# Patient Record
Sex: MALE | Race: WHITE | NOT HISPANIC OR LATINO | URBAN - METROPOLITAN AREA
[De-identification: names, ages, dates, MRNs, and addresses within clinical notes are randomized per-mention and may not be internally consistent; named-entity substitution may affect disease eponyms.]

---
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
## 2023-12-20 ENCOUNTER — APPOINTMENT (OUTPATIENT)
Dept: GENERAL RADIOLOGY | Facility: HOSPITAL | Age: 18
End: 2023-12-20
Payer: MEDICAID

## 2023-12-20 ENCOUNTER — HOSPITAL ENCOUNTER (EMERGENCY)
Facility: HOSPITAL | Age: 18
Discharge: HOME | End: 2023-12-21
Attending: EMERGENCY MEDICINE
Payer: MEDICAID

## 2023-12-20 ENCOUNTER — APPOINTMENT (OUTPATIENT)
Dept: CT IMAGING | Facility: HOSPITAL | Age: 18
End: 2023-12-20
Payer: MEDICAID

## 2023-12-20 DIAGNOSIS — R11.2 NAUSEA AND VOMITING, UNSPECIFIED VOMITING TYPE: ICD-10-CM

## 2023-12-20 DIAGNOSIS — R10.84 GENERALIZED ABDOMINAL PAIN: Primary | ICD-10-CM

## 2023-12-20 DIAGNOSIS — K21.9 GASTROESOPHAGEAL REFLUX DISEASE, UNSPECIFIED WHETHER ESOPHAGITIS PRESENT: ICD-10-CM

## 2023-12-20 LAB
ALBUMIN SERPL-MCNC: 4.8 G/DL (ref 3.5–5.2)
ALBUMIN/GLOB SERPL: 1.3 G/DL
ALP SERPL-CCNC: 135 U/L (ref 39–117)
ALT SERPL W P-5'-P-CCNC: 60 U/L (ref 1–41)
ANION GAP SERPL CALCULATED.3IONS-SCNC: 17 MMOL/L (ref 5–15)
AST SERPL-CCNC: 30 U/L (ref 1–40)
BASOPHILS # BLD AUTO: 0.1 10*3/MM3 (ref 0–0.2)
BASOPHILS NFR BLD AUTO: 1 % (ref 0–1.5)
BILIRUB SERPL-MCNC: 0.8 MG/DL (ref 0–1.2)
BUN SERPL-MCNC: 8 MG/DL (ref 6–20)
BUN/CREAT SERPL: 6.5 (ref 7–25)
CALCIUM SPEC-SCNC: 10.1 MG/DL (ref 8.6–10.5)
CHLORIDE SERPL-SCNC: 99 MMOL/L (ref 98–107)
CK SERPL-CCNC: 191 U/L (ref 20–200)
CO2 SERPL-SCNC: 21 MMOL/L (ref 22–29)
CREAT SERPL-MCNC: 1.23 MG/DL (ref 0.76–1.27)
DEPRECATED RDW RBC AUTO: 43.8 FL (ref 37–54)
EGFRCR SERPLBLD CKD-EPI 2021: 86.2 ML/MIN/1.73
EOSINOPHIL # BLD AUTO: 0 10*3/MM3 (ref 0–0.4)
EOSINOPHIL NFR BLD AUTO: 0.3 % (ref 0.3–6.2)
ERYTHROCYTE [DISTWIDTH] IN BLOOD BY AUTOMATED COUNT: 13.5 % (ref 12.3–15.4)
GLOBULIN UR ELPH-MCNC: 3.6 GM/DL
GLUCOSE SERPL-MCNC: 110 MG/DL (ref 65–99)
HCT VFR BLD AUTO: 51.6 % (ref 37.5–51)
HGB BLD-MCNC: 17.7 G/DL (ref 13–17.7)
LIPASE SERPL-CCNC: 18 U/L (ref 13–60)
LYMPHOCYTES # BLD AUTO: 3.3 10*3/MM3 (ref 0.7–3.1)
LYMPHOCYTES NFR BLD AUTO: 24.1 % (ref 19.6–45.3)
MCH RBC QN AUTO: 29.9 PG (ref 26.6–33)
MCHC RBC AUTO-ENTMCNC: 34.3 G/DL (ref 31.5–35.7)
MCV RBC AUTO: 87.3 FL (ref 79–97)
MONOCYTES # BLD AUTO: 1 10*3/MM3 (ref 0.1–0.9)
MONOCYTES NFR BLD AUTO: 7.4 % (ref 5–12)
NEUTROPHILS NFR BLD AUTO: 67.2 % (ref 42.7–76)
NEUTROPHILS NFR BLD AUTO: 9.3 10*3/MM3 (ref 1.7–7)
NRBC BLD AUTO-RTO: 0.1 /100 WBC (ref 0–0.2)
PLATELET # BLD AUTO: 338 10*3/MM3 (ref 140–450)
PMV BLD AUTO: 8 FL (ref 6–12)
POTASSIUM SERPL-SCNC: 3.6 MMOL/L (ref 3.5–5.2)
PROT SERPL-MCNC: 8.4 G/DL (ref 6–8.5)
RBC # BLD AUTO: 5.91 10*6/MM3 (ref 4.14–5.8)
SODIUM SERPL-SCNC: 137 MMOL/L (ref 136–145)
TROPONIN T SERPL HS-MCNC: 8 NG/L
WBC NRBC COR # BLD AUTO: 13.8 10*3/MM3 (ref 3.4–10.8)

## 2023-12-20 PROCEDURE — 83690 ASSAY OF LIPASE: CPT | Performed by: PHYSICIAN ASSISTANT

## 2023-12-20 PROCEDURE — 84484 ASSAY OF TROPONIN QUANT: CPT | Performed by: PHYSICIAN ASSISTANT

## 2023-12-20 PROCEDURE — 85025 COMPLETE CBC W/AUTO DIFF WBC: CPT | Performed by: PHYSICIAN ASSISTANT

## 2023-12-20 PROCEDURE — 72072 X-RAY EXAM THORAC SPINE 3VWS: CPT

## 2023-12-20 PROCEDURE — 25510000001 IOPAMIDOL PER 1 ML: Performed by: EMERGENCY MEDICINE

## 2023-12-20 PROCEDURE — 80053 COMPREHEN METABOLIC PANEL: CPT | Performed by: PHYSICIAN ASSISTANT

## 2023-12-20 PROCEDURE — 25010000002 DIPHENHYDRAMINE PER 50 MG: Performed by: PHYSICIAN ASSISTANT

## 2023-12-20 PROCEDURE — 93005 ELECTROCARDIOGRAM TRACING: CPT | Performed by: PHYSICIAN ASSISTANT

## 2023-12-20 PROCEDURE — 82550 ASSAY OF CK (CPK): CPT | Performed by: PHYSICIAN ASSISTANT

## 2023-12-20 PROCEDURE — 99285 EMERGENCY DEPT VISIT HI MDM: CPT

## 2023-12-20 PROCEDURE — 96374 THER/PROPH/DIAG INJ IV PUSH: CPT

## 2023-12-20 PROCEDURE — 71045 X-RAY EXAM CHEST 1 VIEW: CPT

## 2023-12-20 PROCEDURE — 96375 TX/PRO/DX INJ NEW DRUG ADDON: CPT

## 2023-12-20 PROCEDURE — 25010000002 ONDANSETRON PER 1 MG: Performed by: PHYSICIAN ASSISTANT

## 2023-12-20 PROCEDURE — 25010000002 METOCLOPRAMIDE PER 10 MG: Performed by: PHYSICIAN ASSISTANT

## 2023-12-20 PROCEDURE — 74177 CT ABD & PELVIS W/CONTRAST: CPT

## 2023-12-20 RX ORDER — SODIUM CHLORIDE 0.9 % (FLUSH) 0.9 %
10 SYRINGE (ML) INJECTION AS NEEDED
Status: DISCONTINUED | OUTPATIENT
Start: 2023-12-20 | End: 2023-12-21 | Stop reason: HOSPADM

## 2023-12-20 RX ORDER — METOPROLOL TARTRATE 1 MG/ML
2.5 INJECTION, SOLUTION INTRAVENOUS ONCE
Status: DISCONTINUED | OUTPATIENT
Start: 2023-12-20 | End: 2023-12-20

## 2023-12-20 RX ORDER — FAMOTIDINE 10 MG/ML
20 INJECTION, SOLUTION INTRAVENOUS ONCE
Status: COMPLETED | OUTPATIENT
Start: 2023-12-20 | End: 2023-12-20

## 2023-12-20 RX ORDER — ONDANSETRON 2 MG/ML
4 INJECTION INTRAMUSCULAR; INTRAVENOUS ONCE
Status: COMPLETED | OUTPATIENT
Start: 2023-12-20 | End: 2023-12-20

## 2023-12-20 RX ORDER — DIPHENHYDRAMINE HYDROCHLORIDE 50 MG/ML
25 INJECTION INTRAMUSCULAR; INTRAVENOUS ONCE
Status: COMPLETED | OUTPATIENT
Start: 2023-12-20 | End: 2023-12-20

## 2023-12-20 RX ORDER — METOCLOPRAMIDE HYDROCHLORIDE 5 MG/ML
10 INJECTION INTRAMUSCULAR; INTRAVENOUS ONCE
Status: COMPLETED | OUTPATIENT
Start: 2023-12-20 | End: 2023-12-20

## 2023-12-20 RX ORDER — ALUMINA, MAGNESIA, AND SIMETHICONE 2400; 2400; 240 MG/30ML; MG/30ML; MG/30ML
15 SUSPENSION ORAL EVERY 6 HOURS PRN
Status: DISCONTINUED | OUTPATIENT
Start: 2023-12-20 | End: 2023-12-21 | Stop reason: HOSPADM

## 2023-12-20 RX ADMIN — ALUMINUM HYDROXIDE, MAGNESIUM HYDROXIDE, AND DIMETHICONE 15 ML: 400; 400; 40 SUSPENSION ORAL at 22:31

## 2023-12-20 RX ADMIN — ONDANSETRON 4 MG: 2 INJECTION INTRAMUSCULAR; INTRAVENOUS at 20:55

## 2023-12-20 RX ADMIN — METOCLOPRAMIDE 10 MG: 5 INJECTION, SOLUTION INTRAMUSCULAR; INTRAVENOUS at 22:55

## 2023-12-20 RX ADMIN — IOPAMIDOL 100 ML: 755 INJECTION, SOLUTION INTRAVENOUS at 23:34

## 2023-12-20 RX ADMIN — FAMOTIDINE 20 MG: 10 INJECTION INTRAVENOUS at 20:56

## 2023-12-20 RX ADMIN — DIPHENHYDRAMINE HYDROCHLORIDE 25 MG: 50 INJECTION, SOLUTION INTRAMUSCULAR; INTRAVENOUS at 22:55

## 2023-12-21 ENCOUNTER — NURSE TRIAGE (OUTPATIENT)
Dept: CALL CENTER | Facility: HOSPITAL | Age: 18
End: 2023-12-21
Payer: MEDICAID

## 2023-12-21 VITALS
RESPIRATION RATE: 18 BRPM | TEMPERATURE: 97.8 F | DIASTOLIC BLOOD PRESSURE: 94 MMHG | HEIGHT: 72 IN | SYSTOLIC BLOOD PRESSURE: 164 MMHG | BODY MASS INDEX: 40.82 KG/M2 | WEIGHT: 301.37 LBS | OXYGEN SATURATION: 99 % | HEART RATE: 92 BPM

## 2023-12-21 LAB
QT INTERVAL: 307 MS
QTC INTERVAL: 410 MS

## 2023-12-21 RX ORDER — ONDANSETRON 8 MG/1
8 TABLET, ORALLY DISINTEGRATING ORAL EVERY 8 HOURS PRN
Qty: 20 TABLET | Refills: 0 | Status: SHIPPED | OUTPATIENT
Start: 2023-12-21 | End: 2023-12-23 | Stop reason: HOSPADM

## 2023-12-21 RX ORDER — PANTOPRAZOLE SODIUM 20 MG/1
20 TABLET, DELAYED RELEASE ORAL DAILY
Qty: 30 TABLET | Refills: 0 | Status: SHIPPED | OUTPATIENT
Start: 2023-12-21 | End: 2023-12-23 | Stop reason: HOSPADM

## 2023-12-21 NOTE — ED PROVIDER NOTES
[facility]  [address]  [facility] [address]      Radha Woodard : 1994 MRN: [id number]        2018  Time Session Began: 4 PM  Time Session Ended: 4:35 PM    Session Type: 30 Minute Therapy (81648)    Others Present: no    Intervention: Behavioral, Cognitive, Supportive    Suicide/Homicide/Violence Ideation: No    If Yes, explain:     Current Outpatient Prescriptions   Medication Sig   • verapamil 120 MG 24 hr capsule TAKE 1 CAPSULE BY MOUTH EVERY NIGHT   • prochlorperazine (COMPAZINE) 5 MG tablet Take 1-2 tablets by mouth every 6 hours as needed for Nausea (migraine).   • PARoxetine (PAXIL) 20 MG tablet Take 1 tablet by mouth daily.     No current facility-administered medications for this visit.        Change in Medication(s) Reported: No  If Yes, explain:     Patient/Family Education Provided: Yes  Patient/Family Displays Understanding: Yes    If No, explain:     Chief complaint in patient's own words: \"I think I'm doing good. Our communication has gotten better. We haven't had a blow out argument. There is more communication and understanding. When I get frustrated, I try to talk about it and I don't wait until a blow up. I try to do the breathing relaxation and that helps quite a bit. Football has started and the exercise helps too.\"    Progress Note containing chief complaint and symptoms/problems related to the complaint:    D: The patient presented for individual psychotherapy. She reported a decrease in her anxiety. She discussed her increased awareness when she becomes irritable and explored how she is able to \"catch\" herself. She identified and discussed a recent situation during which she became angry with her roommate, was able to stop her angry communication and assertively and calmly express her wants with her partner. She reported she continues to use relaxation techniques which are useful in  "Subjective   History of Present Illness  Chief Complaint: Nausea, \"back tingling\"    Patient is a 18 year old  male with no significant past medical history presents to the ER with multiple complaints.  Patient complaining of nausea and vomiting as well as tingling in his upper back since September, 3 months ago.  Patient states that he tried hallucinogenic mushrooms 3 months ago and since then he has had the symptoms.  He has not followed up with primary care or other specialist in this time.  He denies any trauma or fall.  No other illicit substances.  He reports tingling in his thoracic spine that does not radiate, no reports of pain.  Patient also complains of nausea with increased phlegm production and feels like he chokes on his own saliva.  Patient states when this happens it causes him to gag and vomit.  He states that he has been able to eat and sometimes eating helps with his symptoms but when he starts to cough he cannot hold anything down.  He states his coughing and nausea is worse in the morning when he wakes up.  The symptoms are worse when he lays flat.  He denies any hematemesis.  No abdominal pain, no diarrhea hematochezia or melena.  No fever or chills.  No chest pain or shortness of breath    PCP: None    History provided by:  Patient      Review of Systems   Constitutional:  Negative for chills and fever.   HENT:  Positive for trouble swallowing. Negative for congestion.    Eyes: Negative.    Respiratory:  Positive for cough and chest tightness. Negative for shortness of breath and wheezing.    Cardiovascular:  Negative for chest pain.   Gastrointestinal:  Positive for nausea. Negative for abdominal pain, anal bleeding and vomiting.   Endocrine: Negative.    Genitourinary:  Negative for dysuria.   Musculoskeletal:  Positive for back pain. Negative for myalgias and neck pain.   Skin:  Negative for rash.   Allergic/Immunologic: Negative.    Neurological:  Negative for weakness and " headaches.   Psychiatric/Behavioral:  Negative for behavioral problems.    All other systems reviewed and are negative.      No past medical history on file.    No Known Allergies    No past surgical history on file.    No family history on file.    Social History     Socioeconomic History    Marital status: Single           Objective   Physical Exam  Vitals and nursing note reviewed.   Constitutional:       General: He is not in acute distress.     Appearance: Normal appearance. He is obese. He is not diaphoretic.   HENT:      Nose: Nose normal. No congestion.      Mouth/Throat:      Mouth: Mucous membranes are moist.      Pharynx: No oropharyngeal exudate or posterior oropharyngeal erythema.   Eyes:      Extraocular Movements: Extraocular movements intact.      Pupils: Pupils are equal, round, and reactive to light.   Cardiovascular:      Rate and Rhythm: Normal rate and regular rhythm.      Pulses: Normal pulses.      Heart sounds: Normal heart sounds. No murmur heard.  Pulmonary:      Effort: Pulmonary effort is normal.      Breath sounds: Normal breath sounds. No wheezing.   Abdominal:      General: Abdomen is flat.      Palpations: Abdomen is soft.      Tenderness: There is no abdominal tenderness.   Skin:     General: Skin is warm.      Capillary Refill: Capillary refill takes less than 2 seconds.   Neurological:      General: No focal deficit present.      Mental Status: He is alert and oriented to person, place, and time.      Motor: No weakness.   Psychiatric:         Mood and Affect: Mood normal.         Behavior: Behavior normal.         ECG 12 Lead      Date/Time: 12/21/2023 4:21 AM    Performed by: Amanda Cho PA  Authorized by: Baudilio Yepez MD  Interpreted by physician  Previous ECG: no previous ECG available  Rhythm: sinus tachycardia  Rate: tachycardic  BPM: 107  QRS axis: normal  Conduction: conduction normal  Clinical impression: non-specific ECG               ED Course  ED Course as of  reducing her anxiety. She discussed her increased exercise due to football practice and noted that the increased exercise helps to reduce her stress and anxiety. She reported she watched several MARQUISE Talks but had difficulty sitting still to watch the entire talk. She explored her efforts to focus on positive aspects of her life. She stated she would like to schedule her following health follow-up appointment with this writer and than transfer to a psychotherapist for longer term psychotherapy.  A: Utilized a supportive and cognitive behavioral approach with the patient. Reinforced her use of assertive communication, exercise, relaxation and distraction techniques to manage her stress and anxiety. Facilitated a discussion regarding the benefits of focusing on gratitude and positive aspects of life. Provided education and handouts regarding perfectionism and how to challenge and replace should/must thinking that attributes to perfectionism. Discussed the patient's interest in a transfer to a therapist for longer term psychotherapy.  R:  The patient was able to take responsibility for her anger and change behavior. She demonstrated insight regarding the importance of using assertive communication to express her feelings and wants, doing so before her anger intensifies and leads to a negative style of communication. She was open to feedback regarding her perfectionism and should/must thinking and participated in exploring how this type of thinking generates anxiety, anger and frustration. She is willing to schedule a final follow-up appointment this writer and is motivated to continue with longer term psychotherapy.  P:  The plan is to casual a final follow-up appointment with this writer and to facilitate a transfer for longer term psychotherapy.      Need for Community Resources Assessed: Yes    Resources Needed: No    If Yes, what resources:     Primary Diagnosis: Generalized Anxiety Disorder  : 2  "12/21/23 0425   Wed Dec 20, 2023   2204 Patient's elevated blood pressure and tachycardia during episodes of dry heaving.  When patient is resting and not dry heaving blood pressure 159/91 and heart rate 88 []   u Dec 21, 2023   0033 Patient reevaluated, he is resting with eyes closed, no longer dry heaving.  He reports feeling much better after Reglan and Benadryl.  Offered observation admission for IV hydration, antiemetics and GI consultation in the morning.  Patient states he prefers to be discharged home to sleep at home and follow-up outpatient.  Patient was advised that he may return to the ER at any point should his symptoms become worse or should he change his mind regarding admission. [MC]      ED Course User Index  [MC] Amanda Cho PA    /94   Pulse 92   Temp 97.8 °F (36.6 °C) (Oral)   Resp 18   Ht 182.9 cm (72\")   Wt (!) 137 kg (301 lb 5.9 oz)   SpO2 99%   BMI 40.87 kg/m²   Labs Reviewed   COMPREHENSIVE METABOLIC PANEL - Abnormal; Notable for the following components:       Result Value    Glucose 110 (*)     CO2 21.0 (*)     ALT (SGPT) 60 (*)     Alkaline Phosphatase 135 (*)     BUN/Creatinine Ratio 6.5 (*)     Anion Gap 17.0 (*)     All other components within normal limits    Narrative:     GFR Normal >60  Chronic Kidney Disease <60  Kidney Failure <15     CBC WITH AUTO DIFFERENTIAL - Abnormal; Notable for the following components:    WBC 13.80 (*)     RBC 5.91 (*)     Hematocrit 51.6 (*)     Neutrophils, Absolute 9.30 (*)     Lymphocytes, Absolute 3.30 (*)     Monocytes, Absolute 1.00 (*)     All other components within normal limits   LIPASE - Normal   SINGLE HSTROPONIN T - Normal    Narrative:     High Sensitive Troponin T Reference Range:  <14.0 ng/L- Negative Female for AMI  <22.0 ng/L- Negative Male for AMI  >=14 - Abnormal Female indicating possible myocardial injury.  >=22 - Abnormal Male indicating possible myocardial injury.   Clinicians would have to utilize clinical " Moderate    Treatment Plan: Unchanged    Discharge Plan: Strategies Discussed to Maintain Gains    Next Appointment: March 12, 2018  Annette Weiss, Ph.D., [facility] [place]         acumen, EKG, Troponin, and serial changes to determine if it is an Acute Myocardial Infarction or myocardial injury due to an underlying chronic condition.        CK - Normal   CBC AND DIFFERENTIAL    Narrative:     The following orders were created for panel order CBC & Differential.  Procedure                               Abnormality         Status                     ---------                               -----------         ------                     CBC Auto Differential[[id number]]        Abnormal            Final result                 Please view results for these tests on the individual orders.     Medications   famotidine (PEPCID) injection 20 mg (20 mg Intravenous Given 12/20/23 2056)   ondansetron (ZOFRAN) injection 4 mg (4 mg Intravenous Given 12/20/23 2055)   metoclopramide (REGLAN) injection 10 mg (10 mg Intravenous Given 12/20/23 2255)   diphenhydrAMINE (BENADRYL) injection 25 mg (25 mg Intravenous Given 12/20/23 2255)   iopamidol (ISOVUE-370) 76 % injection 100 mL (100 mL Intravenous Given 12/20/23 2334)     CT Abdomen Pelvis With Contrast    Result Date: 12/20/2023  Impression: 1.No acute intra-abdominal or intrapelvic process. 2.Ancillary findings as described above. Electronically Signed: Jovana Kern MD  12/20/2023 11:38 PM EST  Workstation ID: [id number]    XR Spine Thoracic 3 View    Result Date: 12/20/2023  Impression: No radiographic evidence of acute fracture or traumatic subluxation of the thoracic spine. Electronically Signed: Daniel Shay MD  12/20/2023 9:15 PM EST  Workstation ID: [id number]    XR Chest 1 View    Result Date: 12/20/2023  Impression: No acute pulmonary process Electronically Signed: Stone Emery MD  12/20/2023 9:01 PM EST  Workstation ID: [id number]                                            Medical Decision Making  Differential Dx (Includes but not limited to): Fracture contusion, pneumonia, GERD, hiatal hernia, reflux, esophagitis, Zenker's diverticulum  Medical  Records Reviewed: No pertinent records to review  Labs: On my interpretation, CBC no leukocytosis, CMP relatively unremarkable.  Normal troponin, lipase.  Imaging: On my interpretation, chest x-ray shows no obvious pneumonia.  CT abdomen pelvis shows no obvious obstruction, abscess diverticulitis or infectious process  Telemetry: EKG interpretation: Reviewed by myself interpreted by ER attending, sinus tachycardia rate of 107 no acute ST changes  Testing considered but not ordered: CT head patient denies headache.  Nature of Complaint: Acute  Admission vs Discharge: Discharge  Discussion: While in the ED IV was placed and labs were obtained appropriate PPE was worn during exam and throughout all encounters with the patient.  Patient had the above evaluation.  On initial evaluation patient is very anxious.  Patient was given Pepcid and Zofran initially with some improvement in his discomfort.  Lab work reassuring.  X-ray imaging unremarkable.  Patient started to complain of reflux symptoms again was given GI cocktail but then started to dry heave and vomit.  He was then given Reglan and Benadryl with resolution of his symptoms.  CT imaging unremarkable.  On reevaluation patient reports he is feeling better.  Offered observation admission for IV hydration, antiemetics and GI consultation, however patient declined, states he prefers to be discharged home and follow-up outpatient.  I feel that patient symptoms are likely related to severe reflux or gastritis.  Patient reports that he is able to swallow and does not have a sensation of food impaction.  Abdominal exam benign, there is no evidence of sepsis or acute infectious process.  Patient be discharged with prescription for Protonix, Zofran and he is encouraged to follow-up with PCP and GI for recheck, he will likely need EGD if symptoms persist.    Discharge plan and instructions were discussed with the patient who verbalized understanding and is in agreement with  the plan, all questions were answered at this time.  Patient is aware of signs symptoms that would require immediate return to the emergency room.  Patient understands importance of following up with primary care provider for further evaluation and worsening concerns as well as blood pressure recheck in the next 4 weeks.    Patient was discharged in improved stable condition with an upright steady gait.    Patient is aware that discharge does not mean that nothing is wrong but indicates no emergencies present and they must continue care with follow-up as given below or physician of their choice.    Problems Addressed:  Gastroesophageal reflux disease, unspecified whether esophagitis present: acute illness or injury  Generalized abdominal pain: acute illness or injury  Nausea and vomiting, unspecified vomiting type: acute illness or injury    Amount and/or Complexity of Data Reviewed  Labs: ordered. Decision-making details documented in ED Course.  Radiology: ordered. Decision-making details documented in ED Course.  ECG/medicine tests: ordered. Decision-making details documented in ED Course.    Risk  Prescription drug management.        Final diagnoses:   Generalized abdominal pain   Nausea and vomiting, unspecified vomiting type   Gastroesophageal reflux disease, unspecified whether esophagitis present       ED Disposition  ED Disposition       ED Disposition   Discharge    Condition   Stable    Comment   --               PATIENT CONNECTION - [place] [id number] [phone]  Schedule an appointment as soon as possible for a visit in 2 days  As needed, If symptoms worsen    Nino Cho PA-C  [address] [facility] [place] [address] [phone]    Schedule an appointment as soon as possible for a visit in 2 days  As needed, If symptoms worsen    Parker Mittal MD  [address] [facility] [place] [address] [phone]    Schedule an appointment as soon as possible for a visit in 2 days  As  needed, If symptoms worsen         Medication List        New Prescriptions      ondansetron ODT 8 MG disintegrating tablet  Commonly known as: ZOFRAN-ODT  Place 1 tablet under the tongue Every 8 (Eight) Hours As Needed for Nausea.     pantoprazole 20 MG EC tablet  Commonly known as: PROTONIX  Take 1 tablet by mouth Daily.               Where to Get Your Medications        These medications were sent to [facility]/pharmacy #[id number] - [place], [place] - [address] [facility] - [phone]  - [phone] FX  [address] [place] [address] [place] [address]      Hours: 24-hours Phone: [phone]   ondansetron ODT 8 MG disintegrating tablet  pantoprazole 20 MG EC tablet            Amanda Cho PA  12/21/23 9460

## 2023-12-21 NOTE — TELEPHONE ENCOUNTER
"Reason for Disposition   Systolic BP >= 160 OR Diastolic >= 100    Additional Information   Negative: Sounds like a life-threatening emergency to the triager   Negative: Symptom is main concern (e.g., headache, chest pain)   Negative: Low blood pressure is main concern   Negative: Systolic BP >= 160 OR Diastolic >= 100, and any cardiac (e.g., breathing difficulty, chest pain) or neurologic symptoms (e.g., new-onset blurred or double vision)   Negative: Pregnant 20 or more weeks (or postpartum < 6 weeks) with new hand or face swelling   Negative: Pregnant 20 or more weeks (or postpartum < 6 weeks) and Systolic BP >= 160 OR Diastolic >= 110   Negative: Patient sounds very sick or weak to the triager   Negative: Systolic BP >= 200 OR Diastolic >= 120 and having NO cardiac or neurologic symptoms   Negative: Pregnant 20 or more weeks (or postpartum < 6 weeks) with Systolic BP >= 140 OR Diastolic >= 90   Negative: Systolic BP >= 180 OR Diastolic >= 110, and missed most recent dose of blood pressure medication   Negative: Systolic BP >= 180 OR Diastolic >= 110   Negative: Patient wants to be seen   Negative: Ran out of BP medications   Negative: Taking BP medications and feels is having side effects (e.g., impotence, cough, dizziness)    Answer Assessment - Initial Assessment Questions  1. BLOOD PRESSURE: \"What is the blood pressure?\" \"Did you take at least two measurements 5 minutes apart?\"      154/101, 170/112 in Er last night was seen for vomiting and GERD  2. ONSET: \"When did you take your blood pressure?\"      Has been this way unknown amount of time  3. HOW: \"How did you take your blood pressure?\" (e.g., automatic home BP monitor, visiting nurse)      ER checked  4. HISTORY: \"Do you have a history of high blood pressure?\"      Runs in his family  5. MEDICINES: \"Are you taking any medicines for blood pressure?\" \"Have you missed any doses recently?\"      none  6. OTHER SYMPTOMS: \"Do you have any symptoms?\" (e.g., " "blurred vision, chest pain, difficulty breathing, headache, weakness)      none  7. PREGNANCY: \"Is there any chance you are pregnant?\" \"When was your last menstrual period?\"      na    Protocols used: Blood Pressure - High-ADULT-OH    "

## 2023-12-21 NOTE — DISCHARGE INSTRUCTIONS
Take Protonix once daily to help with reflux.  Take Zofran as needed for nausea vomiting    Follow-up with primary care for recheck  Follow-up with GI for reevaluation, you may need EGD.    Return to the ER for new or worsening symptoms or if you change her mind regarding admission.

## 2023-12-22 ENCOUNTER — HOSPITAL ENCOUNTER (OUTPATIENT)
Facility: HOSPITAL | Age: 18
Setting detail: OBSERVATION
Discharge: HOME | End: 2023-12-23
Attending: EMERGENCY MEDICINE | Admitting: EMERGENCY MEDICINE
Payer: MEDICAID

## 2023-12-22 ENCOUNTER — ANESTHESIA (OUTPATIENT)
Dept: GASTROENTEROLOGY | Facility: HOSPITAL | Age: 18
End: 2023-12-22
Payer: MEDICAID

## 2023-12-22 ENCOUNTER — ANESTHESIA EVENT (OUTPATIENT)
Dept: GASTROENTEROLOGY | Facility: HOSPITAL | Age: 18
End: 2023-12-22
Payer: MEDICAID

## 2023-12-22 ENCOUNTER — APPOINTMENT (OUTPATIENT)
Dept: ULTRASOUND IMAGING | Facility: HOSPITAL | Age: 18
End: 2023-12-22
Payer: MEDICAID

## 2023-12-22 ENCOUNTER — ON CAMPUS - OUTPATIENT (OUTPATIENT)
Dept: URBAN - METROPOLITAN AREA HOSPITAL 85 | Facility: HOSPITAL | Age: 18
End: 2023-12-22
Payer: COMMERCIAL

## 2023-12-22 DIAGNOSIS — R11.2 NAUSEA WITH VOMITING, UNSPECIFIED: ICD-10-CM

## 2023-12-22 DIAGNOSIS — K20.80 OTHER ESOPHAGITIS WITHOUT BLEEDING: ICD-10-CM

## 2023-12-22 DIAGNOSIS — K29.80 DUODENITIS WITHOUT BLEEDING: ICD-10-CM

## 2023-12-22 DIAGNOSIS — K29.50 UNSPECIFIED CHRONIC GASTRITIS WITHOUT BLEEDING: ICD-10-CM

## 2023-12-22 DIAGNOSIS — R10.13 EPIGASTRIC PAIN: ICD-10-CM

## 2023-12-22 DIAGNOSIS — B96.81 HELICOBACTER PYLORI [H. PYLORI] AS THE CAUSE OF DISEASES CLA: ICD-10-CM

## 2023-12-22 DIAGNOSIS — R11.2 NAUSEA AND VOMITING, UNSPECIFIED VOMITING TYPE: Primary | ICD-10-CM

## 2023-12-22 LAB
ALBUMIN SERPL-MCNC: 4.6 G/DL (ref 3.5–5.2)
ALBUMIN/GLOB SERPL: 1.5 G/DL
ALP SERPL-CCNC: 121 U/L (ref 39–117)
ALPHA1 GLOB MFR UR ELPH: 151 MG/DL (ref 90–200)
ALT SERPL W P-5'-P-CCNC: 50 U/L (ref 1–41)
AMPHET+METHAMPHET UR QL: NEGATIVE
ANION GAP SERPL CALCULATED.3IONS-SCNC: 14 MMOL/L (ref 5–15)
AST SERPL-CCNC: 28 U/L (ref 1–40)
BACTERIA UR QL AUTO: NORMAL /HPF
BARBITURATES UR QL SCN: NEGATIVE
BASOPHILS # BLD AUTO: 0.1 10*3/MM3 (ref 0–0.2)
BASOPHILS NFR BLD AUTO: 0.6 % (ref 0–1.5)
BENZODIAZ UR QL SCN: NEGATIVE
BILIRUB SERPL-MCNC: 0.7 MG/DL (ref 0–1.2)
BILIRUB UR QL STRIP: NEGATIVE
BUN SERPL-MCNC: 11 MG/DL (ref 6–20)
BUN/CREAT SERPL: 9.6 (ref 7–25)
CALCIUM SPEC-SCNC: 9.7 MG/DL (ref 8.6–10.5)
CANNABINOIDS SERPL QL: POSITIVE
CERULOPLASMIN SERPL-MCNC: 20 MG/DL (ref 16–31)
CHLORIDE SERPL-SCNC: 96 MMOL/L (ref 98–107)
CLARITY UR: ABNORMAL
CO2 SERPL-SCNC: 25 MMOL/L (ref 22–29)
COCAINE UR QL: NEGATIVE
COLOR UR: ABNORMAL
CREAT SERPL-MCNC: 1.14 MG/DL (ref 0.76–1.27)
DEPRECATED RDW RBC AUTO: 42 FL (ref 37–54)
EGFRCR SERPLBLD CKD-EPI 2021: 94.4 ML/MIN/1.73
EOSINOPHIL # BLD AUTO: 0 10*3/MM3 (ref 0–0.4)
EOSINOPHIL NFR BLD AUTO: 0.2 % (ref 0.3–6.2)
ERYTHROCYTE [DISTWIDTH] IN BLOOD BY AUTOMATED COUNT: 13.5 % (ref 12.3–15.4)
FERRITIN SERPL-MCNC: 219.2 NG/ML (ref 30–400)
GLOBULIN UR ELPH-MCNC: 3 GM/DL
GLUCOSE SERPL-MCNC: 102 MG/DL (ref 65–99)
GLUCOSE UR STRIP-MCNC: NEGATIVE MG/DL
HAV IGM SERPL QL IA: NORMAL
HBV CORE IGM SERPL QL IA: NORMAL
HBV SURFACE AG SERPL QL IA: NORMAL
HCT VFR BLD AUTO: 47.3 % (ref 37.5–51)
HCV AB SER DONR QL: NORMAL
HGB BLD-MCNC: 16.1 G/DL (ref 13–17.7)
HGB UR QL STRIP.AUTO: NEGATIVE
HYALINE CASTS UR QL AUTO: NORMAL /LPF
IRON 24H UR-MRATE: 131 MCG/DL (ref 59–158)
KETONES UR QL STRIP: ABNORMAL
LEUKOCYTE ESTERASE UR QL STRIP.AUTO: ABNORMAL
LIPASE SERPL-CCNC: 27 U/L (ref 13–60)
LYMPHOCYTES # BLD AUTO: 1.7 10*3/MM3 (ref 0.7–3.1)
LYMPHOCYTES NFR BLD AUTO: 14.8 % (ref 19.6–45.3)
MAGNESIUM SERPL-MCNC: 1.9 MG/DL (ref 1.7–2.2)
MCH RBC QN AUTO: 30.2 PG (ref 26.6–33)
MCHC RBC AUTO-ENTMCNC: 34 G/DL (ref 31.5–35.7)
MCV RBC AUTO: 88.8 FL (ref 79–97)
METHADONE UR QL SCN: NEGATIVE
MONOCYTES # BLD AUTO: 0.8 10*3/MM3 (ref 0.1–0.9)
MONOCYTES NFR BLD AUTO: 7.1 % (ref 5–12)
NEUTROPHILS NFR BLD AUTO: 77.3 % (ref 42.7–76)
NEUTROPHILS NFR BLD AUTO: 8.7 10*3/MM3 (ref 1.7–7)
NITRITE UR QL STRIP: NEGATIVE
NRBC BLD AUTO-RTO: 0 /100 WBC (ref 0–0.2)
OPIATES UR QL: NEGATIVE
OXYCODONE UR QL SCN: NEGATIVE
PH UR STRIP.AUTO: <=5 [PH] (ref 5–8)
PLATELET # BLD AUTO: 263 10*3/MM3 (ref 140–450)
PMV BLD AUTO: 7.8 FL (ref 6–12)
POTASSIUM SERPL-SCNC: 3.7 MMOL/L (ref 3.5–5.2)
PROT SERPL-MCNC: 7.6 G/DL (ref 6–8.5)
PROT UR QL STRIP: NEGATIVE
RBC # BLD AUTO: 5.32 10*6/MM3 (ref 4.14–5.8)
RBC # UR STRIP: NORMAL /HPF
REF LAB TEST METHOD: NORMAL
SODIUM SERPL-SCNC: 135 MMOL/L (ref 136–145)
SP GR UR STRIP: 1.02 (ref 1–1.03)
SQUAMOUS #/AREA URNS HPF: NORMAL /HPF
UROBILINOGEN UR QL STRIP: ABNORMAL
WBC # UR STRIP: NORMAL /HPF
WBC NRBC COR # BLD AUTO: 11.3 10*3/MM3 (ref 3.4–10.8)

## 2023-12-22 PROCEDURE — 76705 ECHO EXAM OF ABDOMEN: CPT

## 2023-12-22 PROCEDURE — 80053 COMPREHEN METABOLIC PANEL: CPT | Performed by: NURSE PRACTITIONER

## 2023-12-22 PROCEDURE — 83735 ASSAY OF MAGNESIUM: CPT | Performed by: NURSE PRACTITIONER

## 2023-12-22 PROCEDURE — 86015 ACTIN ANTIBODY EACH: CPT | Performed by: NURSE PRACTITIONER

## 2023-12-22 PROCEDURE — 86038 ANTINUCLEAR ANTIBODIES: CPT | Performed by: NURSE PRACTITIONER

## 2023-12-22 PROCEDURE — 80307 DRUG TEST PRSMV CHEM ANLYZR: CPT | Performed by: NURSE PRACTITIONER

## 2023-12-22 PROCEDURE — G0378 HOSPITAL OBSERVATION PER HR: HCPCS

## 2023-12-22 PROCEDURE — 25010000002 ONDANSETRON PER 1 MG: Performed by: ANESTHESIOLOGY

## 2023-12-22 PROCEDURE — 96374 THER/PROPH/DIAG INJ IV PUSH: CPT

## 2023-12-22 PROCEDURE — 83540 ASSAY OF IRON: CPT | Performed by: NURSE PRACTITIONER

## 2023-12-22 PROCEDURE — 81001 URINALYSIS AUTO W/SCOPE: CPT | Performed by: NURSE PRACTITIONER

## 2023-12-22 PROCEDURE — 25810000003 SODIUM CHLORIDE 0.9 % SOLUTION: Performed by: NURSE PRACTITIONER

## 2023-12-22 PROCEDURE — 86376 MICROSOMAL ANTIBODY EACH: CPT | Performed by: NURSE PRACTITIONER

## 2023-12-22 PROCEDURE — 83690 ASSAY OF LIPASE: CPT | Performed by: NURSE PRACTITIONER

## 2023-12-22 PROCEDURE — 82390 ASSAY OF CERULOPLASMIN: CPT | Performed by: NURSE PRACTITIONER

## 2023-12-22 PROCEDURE — 82728 ASSAY OF FERRITIN: CPT | Performed by: NURSE PRACTITIONER

## 2023-12-22 PROCEDURE — 25010000002 FENTANYL CITRATE (PF) 100 MCG/2ML SOLUTION: Performed by: NURSE ANESTHETIST, CERTIFIED REGISTERED

## 2023-12-22 PROCEDURE — 86381 MITOCHONDRIAL ANTIBODY EACH: CPT | Performed by: NURSE PRACTITIONER

## 2023-12-22 PROCEDURE — 43239 EGD BIOPSY SINGLE/MULTIPLE: CPT | Performed by: INTERNAL MEDICINE

## 2023-12-22 PROCEDURE — 96375 TX/PRO/DX INJ NEW DRUG ADDON: CPT

## 2023-12-22 PROCEDURE — 85025 COMPLETE CBC W/AUTO DIFF WBC: CPT | Performed by: NURSE PRACTITIONER

## 2023-12-22 PROCEDURE — 99285 EMERGENCY DEPT VISIT HI MDM: CPT

## 2023-12-22 PROCEDURE — 25010000002 ONDANSETRON PER 1 MG: Performed by: NURSE ANESTHETIST, CERTIFIED REGISTERED

## 2023-12-22 PROCEDURE — 25010000002 DIPHENHYDRAMINE PER 50 MG: Performed by: NURSE PRACTITIONER

## 2023-12-22 PROCEDURE — 88342 IMHCHEM/IMCYTCHM 1ST ANTB: CPT | Performed by: INTERNAL MEDICINE

## 2023-12-22 PROCEDURE — 82103 ALPHA-1-ANTITRYPSIN TOTAL: CPT | Performed by: NURSE PRACTITIONER

## 2023-12-22 PROCEDURE — 25010000002 PROPOFOL 500 MG/50ML EMULSION: Performed by: NURSE ANESTHETIST, CERTIFIED REGISTERED

## 2023-12-22 PROCEDURE — 25010000002 PROCHLORPERAZINE 10 MG/2ML SOLUTION: Performed by: NURSE PRACTITIONER

## 2023-12-22 PROCEDURE — 25810000003 SODIUM CHLORIDE 0.9 % SOLUTION: Performed by: NURSE ANESTHETIST, CERTIFIED REGISTERED

## 2023-12-22 PROCEDURE — 88305 TISSUE EXAM BY PATHOLOGIST: CPT | Performed by: INTERNAL MEDICINE

## 2023-12-22 PROCEDURE — 80074 ACUTE HEPATITIS PANEL: CPT | Performed by: NURSE PRACTITIONER

## 2023-12-22 RX ORDER — SCOLOPAMINE TRANSDERMAL SYSTEM 1 MG/1
1 PATCH, EXTENDED RELEASE TRANSDERMAL
Status: DISCONTINUED | OUTPATIENT
Start: 2023-12-22 | End: 2023-12-23 | Stop reason: HOSPADM

## 2023-12-22 RX ORDER — SODIUM CHLORIDE 0.9 % (FLUSH) 0.9 %
10 SYRINGE (ML) INJECTION AS NEEDED
Status: DISCONTINUED | OUTPATIENT
Start: 2023-12-22 | End: 2023-12-23 | Stop reason: HOSPADM

## 2023-12-22 RX ORDER — AMOXICILLIN 250 MG
2 CAPSULE ORAL 2 TIMES DAILY
Status: DISCONTINUED | OUTPATIENT
Start: 2023-12-22 | End: 2023-12-23 | Stop reason: HOSPADM

## 2023-12-22 RX ORDER — PROPOFOL 10 MG/ML
INJECTION, EMULSION INTRAVENOUS AS NEEDED
Status: DISCONTINUED | OUTPATIENT
Start: 2023-12-22 | End: 2023-12-22 | Stop reason: SURG

## 2023-12-22 RX ORDER — POLYETHYLENE GLYCOL 3350 17 G/17G
17 POWDER, FOR SOLUTION ORAL DAILY PRN
Status: DISCONTINUED | OUTPATIENT
Start: 2023-12-22 | End: 2023-12-23 | Stop reason: HOSPADM

## 2023-12-22 RX ORDER — ONDANSETRON 2 MG/ML
4 INJECTION INTRAMUSCULAR; INTRAVENOUS EVERY 6 HOURS PRN
Status: DISCONTINUED | OUTPATIENT
Start: 2023-12-22 | End: 2023-12-23 | Stop reason: HOSPADM

## 2023-12-22 RX ORDER — ONDANSETRON 2 MG/ML
INJECTION INTRAMUSCULAR; INTRAVENOUS AS NEEDED
Status: DISCONTINUED | OUTPATIENT
Start: 2023-12-22 | End: 2023-12-22 | Stop reason: SURG

## 2023-12-22 RX ORDER — BISACODYL 5 MG/1
5 TABLET, DELAYED RELEASE ORAL DAILY PRN
Status: DISCONTINUED | OUTPATIENT
Start: 2023-12-22 | End: 2023-12-23 | Stop reason: HOSPADM

## 2023-12-22 RX ORDER — PROCHLORPERAZINE EDISYLATE 5 MG/ML
5 INJECTION INTRAMUSCULAR; INTRAVENOUS ONCE
Status: COMPLETED | OUTPATIENT
Start: 2023-12-22 | End: 2023-12-22

## 2023-12-22 RX ORDER — DIPHENHYDRAMINE HYDROCHLORIDE 50 MG/ML
25 INJECTION INTRAMUSCULAR; INTRAVENOUS ONCE
Status: COMPLETED | OUTPATIENT
Start: 2023-12-22 | End: 2023-12-22

## 2023-12-22 RX ORDER — ONDANSETRON 4 MG/1
4 TABLET, FILM COATED ORAL EVERY 6 HOURS PRN
Status: DISCONTINUED | OUTPATIENT
Start: 2023-12-22 | End: 2023-12-23 | Stop reason: HOSPADM

## 2023-12-22 RX ORDER — ONDANSETRON 2 MG/ML
4 INJECTION INTRAMUSCULAR; INTRAVENOUS ONCE
Status: COMPLETED | OUTPATIENT
Start: 2023-12-22 | End: 2023-12-22

## 2023-12-22 RX ORDER — ACETAMINOPHEN 325 MG/1
650 TABLET ORAL EVERY 4 HOURS PRN
Status: DISCONTINUED | OUTPATIENT
Start: 2023-12-22 | End: 2023-12-23 | Stop reason: HOSPADM

## 2023-12-22 RX ORDER — NITROGLYCERIN 0.4 MG/1
0.4 TABLET SUBLINGUAL
Status: DISCONTINUED | OUTPATIENT
Start: 2023-12-22 | End: 2023-12-23 | Stop reason: HOSPADM

## 2023-12-22 RX ORDER — SODIUM CHLORIDE 0.9 % (FLUSH) 0.9 %
10 SYRINGE (ML) INJECTION EVERY 12 HOURS SCHEDULED
Status: DISCONTINUED | OUTPATIENT
Start: 2023-12-22 | End: 2023-12-23 | Stop reason: HOSPADM

## 2023-12-22 RX ORDER — PANTOPRAZOLE SODIUM 40 MG/1
40 TABLET, DELAYED RELEASE ORAL
Status: DISCONTINUED | OUTPATIENT
Start: 2023-12-22 | End: 2023-12-23 | Stop reason: HOSPADM

## 2023-12-22 RX ORDER — SODIUM CHLORIDE 9 MG/ML
40 INJECTION, SOLUTION INTRAVENOUS AS NEEDED
Status: DISCONTINUED | OUTPATIENT
Start: 2023-12-22 | End: 2023-12-23 | Stop reason: HOSPADM

## 2023-12-22 RX ORDER — FENTANYL CITRATE 50 UG/ML
INJECTION, SOLUTION INTRAMUSCULAR; INTRAVENOUS AS NEEDED
Status: DISCONTINUED | OUTPATIENT
Start: 2023-12-22 | End: 2023-12-22 | Stop reason: SURG

## 2023-12-22 RX ORDER — SODIUM CHLORIDE 9 MG/ML
INJECTION, SOLUTION INTRAVENOUS CONTINUOUS PRN
Status: DISCONTINUED | OUTPATIENT
Start: 2023-12-22 | End: 2023-12-22 | Stop reason: SURG

## 2023-12-22 RX ORDER — BISACODYL 10 MG
10 SUPPOSITORY, RECTAL RECTAL DAILY PRN
Status: DISCONTINUED | OUTPATIENT
Start: 2023-12-22 | End: 2023-12-23 | Stop reason: HOSPADM

## 2023-12-22 RX ORDER — LIDOCAINE HYDROCHLORIDE 20 MG/ML
INJECTION, SOLUTION INFILTRATION; PERINEURAL AS NEEDED
Status: DISCONTINUED | OUTPATIENT
Start: 2023-12-22 | End: 2023-12-22 | Stop reason: SURG

## 2023-12-22 RX ORDER — PANTOPRAZOLE SODIUM 20 MG/1
20 TABLET, DELAYED RELEASE ORAL DAILY
Status: DISCONTINUED | OUTPATIENT
Start: 2023-12-22 | End: 2023-12-22 | Stop reason: SDUPTHER

## 2023-12-22 RX ORDER — ONDANSETRON 2 MG/ML
4 INJECTION INTRAMUSCULAR; INTRAVENOUS EVERY 6 HOURS PRN
Status: DISCONTINUED | OUTPATIENT
Start: 2023-12-22 | End: 2023-12-22

## 2023-12-22 RX ADMIN — SCOPALAMINE 1 PATCH: 1 PATCH, EXTENDED RELEASE TRANSDERMAL at 17:26

## 2023-12-22 RX ADMIN — Medication 10 ML: at 21:11

## 2023-12-22 RX ADMIN — PROPOFOL INJECTABLE EMULSION 50 MG: 10 INJECTION, EMULSION INTRAVENOUS at 16:15

## 2023-12-22 RX ADMIN — ONDANSETRON 4 MG: 2 INJECTION INTRAMUSCULAR; INTRAVENOUS at 15:24

## 2023-12-22 RX ADMIN — DIPHENHYDRAMINE HYDROCHLORIDE 25 MG: 50 INJECTION, SOLUTION INTRAMUSCULAR; INTRAVENOUS at 07:54

## 2023-12-22 RX ADMIN — PANTOPRAZOLE SODIUM 40 MG: 40 TABLET, DELAYED RELEASE ORAL at 17:10

## 2023-12-22 RX ADMIN — ONDANSETRON 4 MG: 2 INJECTION INTRAMUSCULAR; INTRAVENOUS at 16:03

## 2023-12-22 RX ADMIN — FENTANYL CITRATE 100 MCG: 50 INJECTION, SOLUTION INTRAMUSCULAR; INTRAVENOUS at 15:58

## 2023-12-22 RX ADMIN — PROCHLORPERAZINE EDISYLATE 5 MG: 5 INJECTION INTRAMUSCULAR; INTRAVENOUS at 07:54

## 2023-12-22 RX ADMIN — FENTANYL CITRATE 100 MCG: 50 INJECTION, SOLUTION INTRAMUSCULAR; INTRAVENOUS at 16:05

## 2023-12-22 RX ADMIN — PROPOFOL INJECTABLE EMULSION 30 MG: 10 INJECTION, EMULSION INTRAVENOUS at 16:18

## 2023-12-22 RX ADMIN — SODIUM CHLORIDE 1000 ML: 9 INJECTION, SOLUTION INTRAVENOUS at 07:54

## 2023-12-22 RX ADMIN — PROPOFOL INJECTABLE EMULSION 50 MG: 10 INJECTION, EMULSION INTRAVENOUS at 16:14

## 2023-12-22 RX ADMIN — PROPOFOL INJECTABLE EMULSION 50 MG: 10 INJECTION, EMULSION INTRAVENOUS at 16:16

## 2023-12-22 RX ADMIN — SODIUM CHLORIDE: 9 INJECTION, SOLUTION INTRAVENOUS at 15:56

## 2023-12-22 RX ADMIN — LIDOCAINE HYDROCHLORIDE 100 MG: 20 INJECTION, SOLUTION INFILTRATION; PERINEURAL at 16:13

## 2023-12-22 RX ADMIN — PROPOFOL INJECTABLE EMULSION 50 MG: 10 INJECTION, EMULSION INTRAVENOUS at 16:17

## 2023-12-22 RX ADMIN — PROPOFOL INJECTABLE EMULSION 150 MG: 10 INJECTION, EMULSION INTRAVENOUS at 16:13

## 2023-12-22 NOTE — OP NOTE
ESOPHAGOGASTRODUODENOSCOPY Procedure Report    Patient Name:  Mehrdad Pollock  YOB: 2005    Date of Surgery:  12/22/2023     Pre-Op Diagnosis:  Nausea and vomiting, unspecified vomiting type [R11.2]  Epigastric pain [R10.13]       Post Op Diagnosis:  GERD, gastritis, duodenal erosions      Procedure/CPT® Codes:      Procedure(s):  ESOPHAGOGASTRODUODENOSCOPY with biopsy x 2    Staff:  Surgeon(s):  Kassandra Aragon MD         Anesthesia: Monitored Anesthesia Care    Implants:    Nothing was implanted during the procedure    Specimen:        See Below    No blood loss    Complications:  None     Description of Procedure:  Informed consent was obtained for the procedure, including sedation.  Risks of perforation, hemorrhage, adverse drug reaction and aspiration were discussed.  The patient was brought into the endoscopy suite. Continuous cardiopulmonary monitoring was performed. The patient was placed in the left lateral decubitus position.  The bite block was inserted into the patient's mouth. After adequate sedation was attained, the Olympus gastroscope was inserted into the patient's mouth and advanced to the second portion of the duodenum without difficulty.  Circumferential examination was performed. A retroflex exam was performed in the patient's stomach.  On completion of the exam, the bowel was decompressed, the scope was removed from the patient, the patient tolerated the procedure well, there were no immediate post-operative complications.     Examination of the esophagus showed LA grade B erosive esophagitis  Examination of the stomach showed nonerosive gastritis.  Biopsies were obtained from the antrum and body and sent for pathology eval for H. pylori  Retroflex examination of the stomach was normal   Examination of the duodenum showed duodenal erosions in the bulb.  No blood present.  Biopsies were obtained from the second portion of duodenum and the first portion of duodenum and  sent for pathology to evaluate for celiac disease      Impression:  Nausea and vomiting-suspect secondary to duodenal erosions but cannot rule out hyperemesis cannabinoid syndrome  Duodenal erosions  Gastritis  GERD    Recommendations:  Follow-up histopathology  PPI twice daily for 1 to 2 months then once daily  Minimize NSAIDs  Discontinue marijuana use  If patient tolerates p.o., okay to discharge home from GI standpoint  Follow-up in the office within 3 months.  GI will see inpatient as needed.      Kassandra Aragon MD     Date: 12/22/2023  Time: 16:21 EST

## 2023-12-22 NOTE — CONSULTS
"GI CONSULT  NOTE:    Referring Provider:  Dr. Haas    Chief complaint: Nausea and vomiting with epigastric pain    Subjective . \"I keep vomiting\"    History of present illness: Mehrdad Pollock is a 18 y.o. male who presents with numerous symptoms over the last couple months that have been worsening over the last 5 to 7 days. He reports coughing with excessive phlegm that often leads to nausea and vomiting but also has had a decreased appetite with random episodes of vomiting as well.  He questions coffee-ground emesis once after retching for 15 minutes.  He does get intermittent indigestion but denies any heartburn or dysphagia.  Tums helped the indigestion but not the nausea and vomiting.  Occasional abdominal soreness secondary to retching but does report some tingling and pressure between his shoulder blades at times as well as being shaky.  He is unsure if he has lost any weight.  Occasional constipation with last bowel movement 4 days ago that was looser.  No melena or rectal bleeding.  No history abdominal surgeries.  He does take 1-2 800 mg ibuprofen tabs daily intermittently.  He smokes marijuana multiple times per day and did try hallucinogenic mushrooms a couple months ago which seem to start the tingling in his back.  He also reports intermittent alcohol binges 1-2 times per week with no alcohol since his birthday in [birth year].  Denies history of pancreatic or liver disease.    Endo History:  No previous EGD or colonoscopy    Past Medical History:  No past medical history on file.    Past Surgical History:  No past surgical history on file.    Social History:   Smokes marijuana multiple times per day.  Tried hallucinogenic mushrooms 3 months ago.  Reports alcohol binge drinking 1-2 times per week.  Denies IV drug abuse.    Family History:  Denies family history of GI malignancy    Medications:  Medications Prior to Admission   Medication Sig Dispense Refill Last Dose    ondansetron ODT (ZOFRAN-ODT) 8 MG " disintegrating tablet Place 1 tablet under the tongue Every 8 (Eight) Hours As Needed for Nausea. 20 tablet 0     pantoprazole (PROTONIX) 20 MG EC tablet Take 1 tablet by mouth Daily. 30 tablet 0        Scheduled Meds:   Continuous Infusions:   PRN Meds:.  [COMPLETED] Insert Peripheral IV **AND** sodium chloride    ALLERGIES:  Patient has no known allergies.    ROS:  The following systems were reviewed   Constitution:  No fevers, chills, no unintentional weight loss  Skin: no rash, no jaundice  Eyes:  No blurry vision, no eye pain  HENT:  No change in hearing or smell  Resp:  No dyspnea, + cough with excessive phlegm  CV:  No chest pain or palpitations  :  No dysuria, hematuria  Musculoskeletal:  No leg cramps or arthralgias  Neuro:  No tremor, no numbness  Psych:  No depression or confusion, + anxiety    Objective resting in bed, no acute distress, family and nurse in room    Vital Signs:   Vitals:    12/22/23 0832 12/22/23 0932 12/22/23 1002 12/22/23 1034   BP: 134/85 136/84 117/64    BP Location:    Right arm   Patient Position:    Lying   Pulse: 93 103 109 105   Resp:    16   Temp:    97.6 °F (36.4 °C)   TempSrc:    Oral   SpO2: 93% 96% 95% 97%   Weight:       Height:           Physical Exam:     General Appearance:    Awake and alert, in no acute distress, obese   Head:    Normocephalic, without obvious abnormality, atraumatic   Throat:   No oral lesions, no thrush, oral mucosa moist   Lungs:     Respirations regular, even and unlabored   Chest Wall:    No abnormalities observed   Abdomen:     Soft, non-tender, nondistended, no obvious hepatosplenomegaly   Rectal:     Deferred   Extremities:   Moves all extremities, no cyanosis       Skin:   No rash, no jaundice, normal palpation       Neurologic:   Cranial nerves 2 - 12 grossly intact,       Results Review:   I reviewed the patient's labs and imaging.  CBC    Results from last 7 days   Lab Units 12/22/23  0745 12/20/23 2054   WBC 10*3/mm3 11.30* 13.80*  "  HEMOGLOBIN g/dL 16.1 17.7   PLATELETS 10*3/mm3 263 338     CMP   Results from last 7 days   Lab Units 12/22/23  0745 12/20/23  2054   SODIUM mmol/L 135* 137   POTASSIUM mmol/L 3.7 3.6   CHLORIDE mmol/L 96* 99   CO2 mmol/L 25.0 21.0*   BUN mg/dL 11 8   CREATININE mg/dL 1.14 1.23   GLUCOSE mg/dL 102* 110*   ALBUMIN g/dL 4.6 4.8   BILIRUBIN mg/dL 0.7 0.8   ALK PHOS U/L 121* 135*   AST (SGOT) U/L 28 30   ALT (SGPT) U/L 50* 60*   MAGNESIUM mg/dL 1.9  --    LIPASE U/L 27 18     Cr Clearance Estimated Creatinine Clearance: 152 mL/min (by C-G formula based on SCr of 1.14 mg/dL).  Coag     HbA1C No results found for: \"HGBA1C\"  Blood Glucose No results found for: \"POCGLU\"  Infection     UA    Results from last 7 days   Lab Units 12/22/23  0941   NITRITE UA  Negative   WBC UA /HPF 0-2   BACTERIA UA /HPF None Seen   SQUAM EPITHEL UA /HPF 0-2     Imaging Results (Last 72 Hours)       ** No results found for the last 72 hours. **            ASSESSMENT:  Recurrent nausea with vomiting  Elevated LFTs  Cough with excessive phlegm  Back discomfort/tingling  Polysubstance abuse  Obesity    PLAN:  Patient is a 19 y/o male who presents with recurrent episodes of nausea and vomiting as well as some back discomfort and tingling with cough and excessive phlegm possibly contributing.  He has had issues for the last couple months that have been worsening over the last 1 to 2 weeks.  CT abdomen and pelvis, chest x-ray and thoracic spine x-ray have been unremarkable.  Lipase negative although mildly elevated liver enzymes.  He does admit to intermittent binge drinking as well as trying hallucinogenic mushrooms a couple months ago and smoking marijuana multiple times daily.  We will proceed with EGD evaluation today to rule out underlying ulcer or erosive esophagitis.  Cannabinoid hyperemesis syndrome also in the differential and we discussed complete cessation of marijuana.  We will proceed with full liver serology workup and right upper " quadrant ultrasound, consider HIDA if EGD unremarkable.  Continue supportive care and we will follow.    I discussed the patients findings and my recommendations with the patient.    We appreciate the referral    Electronically signed by SAM Geronimo, 12/22/23, 11:16 AM EST.

## 2023-12-22 NOTE — ED NOTES
"Pt states he \"wished to be dead d/t the pain he is in\". Has never had suicidal thoughts or ideations in the past. No specific plan other than \"wishing to be dead\". Mother with pt. Pt taken to room ,1:1 initiated, charge nurse, primary nurse, and attending notified. Called security and waiting for their arrival at this time. Pt placed in green gown.   "

## 2023-12-22 NOTE — ANESTHESIA PREPROCEDURE EVALUATION
Anesthesia Evaluation     Patient summary reviewed and Nursing notes reviewed   NPO Solid Status: > 8 hours  NPO Liquid Status: > 8 hours           Airway   Mallampati: III  TM distance: >3 FB  Neck ROM: full  No difficulty expected  Dental - normal exam     Pulmonary - normal exam   Cardiovascular - normal exam    ECG reviewed        Neuro/Psych  GI/Hepatic/Renal/Endo    (+) obesity    Musculoskeletal     Abdominal  - normal exam    Bowel sounds: normal.   Substance History      OB/GYN          Other                    Anesthesia Plan    ASA 2     MAC and general     intravenous induction     Anesthetic plan, risks, benefits, and alternatives have been provided, discussed and informed consent has been obtained with: patient.    Plan discussed with CRNA.    CODE STATUS:    Level Of Support Discussed With: Patient  Code Status (Patient has no pulse and is not breathing): CPR (Attempt to Resuscitate)  Medical Interventions (Patient has pulse or is breathing): Full Support

## 2023-12-22 NOTE — ANESTHESIA POSTPROCEDURE EVALUATION
Patient: Mehrdad Pollock    Procedure Summary       Date: 12/22/23 Room / Location: [facility] ENDOSCOPY 1 / [facility] ENDOSCOPY    Anesthesia Start: 1556 Anesthesia Stop: 1626    Procedure: ESOPHAGOGASTRODUODENOSCOPY with biopsy x 2 Diagnosis:       Nausea and vomiting, unspecified vomiting type      Epigastric pain      (Nausea and vomiting, unspecified vomiting type [R11.2])      (Epigastric pain [R10.13])    Surgeons: Kassandra Aragon MD Provider: Yg Cardoso MD    Anesthesia Type: MAC, general ASA Status: 2            Anesthesia Type: MAC, general    Vitals  Vitals Value Taken Time   /79 12/22/23 1645   Temp     Pulse 97 12/22/23 1647   Resp     SpO2 98 % 12/22/23 1647   Vitals shown include unfiled device data.        Post Anesthesia Care and Evaluation    Patient location during evaluation: PACU  Patient participation: complete - patient participated  Level of consciousness: awake  Pain scale: See nurse's notes for pain score.  Pain management: adequate    Airway patency: patent  Anesthetic complications: No anesthetic complications  PONV Status: none  Cardiovascular status: acceptable  Respiratory status: acceptable and spontaneous ventilation  Hydration status: acceptable    Comments: Patient seen and examined postoperatively; vital signs stable; SpO2 greater than or equal to 90%; cardiopulmonary status stable; nausea/vomiting adequately controlled; pain adequately controlled; no apparent anesthesia complications; patient discharged from anesthesia care when discharge criteria were met

## 2023-12-22 NOTE — ED NOTES
"Pt states he wants to harm himself due to the pain. Pt states \"I just want this to stop and go away\" Pt denies any plan or previous SI ideation. Cords kept to a minimum in the room. Per provider order pt does require constant vital signs monitoring. Pt belongings which consist of a sweatshirt was taken and locked up by security. Extra linens were removed from the room. Security at bedside for 1:1 observation. Pt mom also at bedside.   "

## 2023-12-22 NOTE — PLAN OF CARE
Goal Outcome Evaluation:  Plan of Care Reviewed With: patient oriented to the unit. IV flushed. Mother at bedside. No nausea/vomiting at this time. Consent signed for EGD. Call light in reach. Will continue to monitor.

## 2023-12-22 NOTE — ED PROVIDER NOTES
Subjective   History of Present Illness  Chief complaint: Vomiting      Context: Patient is a 18-year-old male who presents with his mother with complaints of epigastric pain nausea and vomiting.    that started 3 months ago after trying hallucinogenic mushrooms.  Frequent marijuana use denies any IVDA.  Denies any hematemesis melena or hematochezia.  No fever or upper respiratory complaints or diarrhea.  No chest pain or shortness of breath.  Denies any alcohol use NSAID use melena or hematochezia.  The ER 2 days ago for the same symptoms and states the medications he was discharged with are not working; he has a new patient appointment with GI in January but feels like his symptoms are worsening        PCP: None           Review of Systems   Constitutional:  Negative for fever.       No past medical history on file.    No Known Allergies    No past surgical history on file.    No family history on file.    Social History     Socioeconomic History    Marital status: Single           Objective   Physical Exam    Vital signs and triage nurse note reviewed.  Constitutional: Awake, alert; irritable cooperative.  Mother at bedside  HEENT: Normocephalic, atraumatic;  with intact EOM; oropharynx is pink and moist without exudate or erythema.  Neck: Supple, full range of motion without pain;    Cardiovascular: Regular rate and rhythm, normal S1-S2.  Pulmonary: Respiratory effort regular nonlabored, breath sounds clear to auscultation all fields.  Abdomen: Soft, epigastric tenderness nondistended with normoactive bowel sounds; no rebound or guarding.  No peritonitis  Musculoskeletal: Independent range of motion of all extremities with no palpable tenderness or edema.  Neuro: Alert oriented x3, speech is clear and appropriate, GCS 15  Skin:  Fleshtone warm, dry, intact;       Procedures           ED Course      Labs Reviewed   COMPREHENSIVE METABOLIC PANEL - Abnormal; Notable for the following components:       Result Value     Glucose 102 (*)     Sodium 135 (*)     Chloride 96 (*)     ALT (SGPT) 50 (*)     Alkaline Phosphatase 121 (*)     All other components within normal limits    Narrative:     GFR Normal >60  Chronic Kidney Disease <60  Kidney Failure <15     CBC WITH AUTO DIFFERENTIAL - Abnormal; Notable for the following components:    WBC 11.30 (*)     Neutrophil % 77.3 (*)     Lymphocyte % 14.8 (*)     Eosinophil % 0.2 (*)     Neutrophils, Absolute 8.70 (*)     All other components within normal limits   LIPASE - Normal   MAGNESIUM - Normal   URINALYSIS W/ MICROSCOPIC IF INDICATED (NO CULTURE)   URINE DRUG SCREEN   CBC AND DIFFERENTIAL    Narrative:     The following orders were created for panel order CBC & Differential.  Procedure                               Abnormality         Status                     ---------                               -----------         ------                     CBC Auto Differential[[id number]]        Abnormal            Final result                 Please view results for these tests on the individual orders.     Medications   sodium chloride 0.9 % flush 10 mL (has no administration in time range)   sodium chloride 0.9 % bolus 1,000 mL (1,000 mL Intravenous New Bag 12/22/23 0754)   prochlorperazine (COMPAZINE) injection 5 mg (5 mg Intravenous Given 12/22/23 0754)   diphenhydrAMINE (BENADRYL) injection 25 mg (25 mg Intravenous Given 12/22/23 0754)     CT Abdomen Pelvis With Contrast    Result Date: 12/20/2023  Impression: 1.No acute intra-abdominal or intrapelvic process. 2.Ancillary findings as described above. Electronically Signed: Jovana Kern MD  12/20/2023 11:38 PM EST  Workstation ID: [id number]    XR Spine Thoracic 3 View    Result Date: 12/20/2023  Impression: No radiographic evidence of acute fracture or traumatic subluxation of the thoracic spine. Electronically Signed: Daniel Shay MD  12/20/2023 9:15 PM EST  Workstation ID: [id number]    XR Chest 1 View    Result Date:  "12/20/2023  Impression: No acute pulmonary process Electronically Signed: Stone Emery MD  12/20/2023 9:01 PM EST  Workstation ID: [id number]   Prior to Admission medications    Medication Sig Start Date End Date Taking? Authorizing Provider   ondansetron ODT (ZOFRAN-ODT) 8 MG disintegrating tablet Place 1 tablet under the tongue Every 8 (Eight) Hours As Needed for Nausea. 12/21/23   Amanda Cho PA   pantoprazole (PROTONIX) 20 MG EC tablet Take 1 tablet by mouth Daily. 12/21/23   Amanda Cho PA                                            Medical Decision Making      /85   Pulse 93   Temp 98.4 °F (36.9 °C) (Oral)   Resp 18   Ht 188 cm (74\")   Wt (!) 136 kg (300 lb 11.3 oz)   SpO2 93%   BMI 38.61 kg/m²      Chart review: Patient seen on 12/20/2023 for epigastric pain and vomiting unremarkable labs CT of the abdomen obtained patient was offered admission and refused      Radiology interpretation:  CTs reviewed from 2 days ago  Lab interpretation:  Labs all viewed by me and significant for, ALT 50 alk phos 121 white count 11 lipase 27.  UDS UA pending on admission            Appropriate PPE worn during exam.  Patient had an IV established labs obtained was given IV fluids Compazine and Benadryl.  CT not felt to be emergently warranted as this was reviewed from 2 days ago.  He was offered admission at that point refused but states he would like to be admitted today for hydration GI evaluation.  Mother at bedside.  Of note patient verbalized to nursing staff not to myself that he wanted to \"die from the vomiting\" without specific plan hallucinations.  He was then suicidal precautions. SI precautions dc'd as his verbalized statement today without plan was felt to be pain/situational, no psych hx. Mother willing to stay through hospital evaluation       i discussed findings with patient who voices understanding of spent observation    Problems Addressed:  Epigastric pain: complicated acute illness or " injury  Nausea and vomiting, unspecified vomiting type: complicated acute illness or injury    Amount and/or Complexity of Data Reviewed  Labs: ordered.    Risk  Prescription drug management.  Decision regarding hospitalization.        Final diagnoses:   Nausea and vomiting, unspecified vomiting type   Epigastric pain       ED Disposition  ED Disposition       ED Disposition   Decision to Admit    Condition   --    Comment   --               No follow-up provider specified.       Medication List      No changes were made to your prescriptions during this visit.            Zenobia Skaggs, SAM  12/22/23 0928       Zenobia Skaggs, SAM  12/22/23 1001

## 2023-12-23 VITALS
TEMPERATURE: 98.4 F | OXYGEN SATURATION: 97 % | HEIGHT: 74 IN | BODY MASS INDEX: 38.59 KG/M2 | RESPIRATION RATE: 18 BRPM | SYSTOLIC BLOOD PRESSURE: 165 MMHG | DIASTOLIC BLOOD PRESSURE: 79 MMHG | HEART RATE: 99 BPM | WEIGHT: 300.71 LBS

## 2023-12-23 LAB
ANION GAP SERPL CALCULATED.3IONS-SCNC: 14 MMOL/L (ref 5–15)
BASOPHILS # BLD AUTO: 0 10*3/MM3 (ref 0–0.2)
BASOPHILS NFR BLD AUTO: 0.3 % (ref 0–1.5)
BUN SERPL-MCNC: 10 MG/DL (ref 6–20)
BUN/CREAT SERPL: 10 (ref 7–25)
CALCIUM SPEC-SCNC: 9.1 MG/DL (ref 8.6–10.5)
CHLORIDE SERPL-SCNC: 100 MMOL/L (ref 98–107)
CO2 SERPL-SCNC: 24 MMOL/L (ref 22–29)
CREAT SERPL-MCNC: 1 MG/DL (ref 0.76–1.27)
DEPRECATED RDW RBC AUTO: 41.1 FL (ref 37–54)
EGFRCR SERPLBLD CKD-EPI 2021: 110.5 ML/MIN/1.73
EOSINOPHIL # BLD AUTO: 0 10*3/MM3 (ref 0–0.4)
EOSINOPHIL NFR BLD AUTO: 0.6 % (ref 0.3–6.2)
ERYTHROCYTE [DISTWIDTH] IN BLOOD BY AUTOMATED COUNT: 13.2 % (ref 12.3–15.4)
GLUCOSE SERPL-MCNC: 85 MG/DL (ref 65–99)
HCT VFR BLD AUTO: 45.3 % (ref 37.5–51)
HGB BLD-MCNC: 15.5 G/DL (ref 13–17.7)
LYMPHOCYTES # BLD AUTO: 2.4 10*3/MM3 (ref 0.7–3.1)
LYMPHOCYTES NFR BLD AUTO: 29.8 % (ref 19.6–45.3)
MCH RBC QN AUTO: 30.7 PG (ref 26.6–33)
MCHC RBC AUTO-ENTMCNC: 34.2 G/DL (ref 31.5–35.7)
MCV RBC AUTO: 89.9 FL (ref 79–97)
MONOCYTES # BLD AUTO: 0.6 10*3/MM3 (ref 0.1–0.9)
MONOCYTES NFR BLD AUTO: 7.8 % (ref 5–12)
NEUTROPHILS NFR BLD AUTO: 5 10*3/MM3 (ref 1.7–7)
NEUTROPHILS NFR BLD AUTO: 61.5 % (ref 42.7–76)
NRBC BLD AUTO-RTO: 0.1 /100 WBC (ref 0–0.2)
PLATELET # BLD AUTO: 244 10*3/MM3 (ref 140–450)
PMV BLD AUTO: 8 FL (ref 6–12)
POTASSIUM SERPL-SCNC: 3.8 MMOL/L (ref 3.5–5.2)
RBC # BLD AUTO: 5.04 10*6/MM3 (ref 4.14–5.8)
SODIUM SERPL-SCNC: 138 MMOL/L (ref 136–145)
WBC NRBC COR # BLD AUTO: 8.1 10*3/MM3 (ref 3.4–10.8)

## 2023-12-23 PROCEDURE — 96376 TX/PRO/DX INJ SAME DRUG ADON: CPT

## 2023-12-23 PROCEDURE — 80048 BASIC METABOLIC PNL TOTAL CA: CPT | Performed by: NURSE PRACTITIONER

## 2023-12-23 PROCEDURE — 25010000002 ONDANSETRON PER 1 MG: Performed by: NURSE PRACTITIONER

## 2023-12-23 PROCEDURE — G0378 HOSPITAL OBSERVATION PER HR: HCPCS

## 2023-12-23 PROCEDURE — 85025 COMPLETE CBC W/AUTO DIFF WBC: CPT | Performed by: NURSE PRACTITIONER

## 2023-12-23 RX ORDER — SCOLOPAMINE TRANSDERMAL SYSTEM 1 MG/1
1 PATCH, EXTENDED RELEASE TRANSDERMAL
Qty: 30 EACH | Refills: 0 | Status: SHIPPED | OUTPATIENT
Start: 2023-12-25

## 2023-12-23 RX ORDER — PANTOPRAZOLE SODIUM 40 MG/1
40 TABLET, DELAYED RELEASE ORAL DAILY
Qty: 30 TABLET | Refills: 0 | Status: SHIPPED | OUTPATIENT
Start: 2024-02-22

## 2023-12-23 RX ORDER — ONDANSETRON 4 MG/1
4 TABLET, FILM COATED ORAL EVERY 8 HOURS PRN
Qty: 30 TABLET | Refills: 0 | Status: SHIPPED | OUTPATIENT
Start: 2023-12-23

## 2023-12-23 RX ORDER — PANTOPRAZOLE SODIUM 40 MG/1
40 TABLET, DELAYED RELEASE ORAL 2 TIMES DAILY
Qty: 30 TABLET | Refills: 0 | Status: SHIPPED | OUTPATIENT
Start: 2023-12-23 | End: 2024-02-21

## 2023-12-23 RX ADMIN — ONDANSETRON 4 MG: 2 INJECTION INTRAMUSCULAR; INTRAVENOUS at 03:55

## 2023-12-23 RX ADMIN — Medication 10 ML: at 08:29

## 2023-12-23 RX ADMIN — DOCUSATE SODIUM 50 MG AND SENNOSIDES 8.6 MG 2 TABLET: 8.6; 5 TABLET, FILM COATED ORAL at 08:27

## 2023-12-23 RX ADMIN — PANTOPRAZOLE SODIUM 40 MG: 40 TABLET, DELAYED RELEASE ORAL at 08:28

## 2023-12-23 NOTE — SIGNIFICANT NOTE
Case Management Discharge Note      Final Note: (P) d/c home         Selected Continued Care - Discharged on 12/23/2023 Admission date: 12/22/2023 - Discharge disposition: Home or Self Care                              Final Discharge Disposition Code: (P) 01 - home or self-care

## 2023-12-23 NOTE — PLAN OF CARE
Problem: Adult Inpatient Plan of Care  Goal: Plan of Care Review  Outcome: Ongoing, Not Progressing  Flowsheets (Taken 12/23/2023 0052)  Progress: no change  Plan of Care Reviewed With: patient  Goal: Patient-Specific Goal (Individualized)  Outcome: Ongoing, Not Progressing  Goal: Absence of Hospital-Acquired Illness or Injury  Outcome: Ongoing, Not Progressing  Goal: Optimal Comfort and Wellbeing  Outcome: Ongoing, Not Progressing  Goal: Readiness for Transition of Care  Outcome: Ongoing, Not Progressing     Problem: Nausea and Vomiting  Goal: Fluid and Electrolyte Balance  Outcome: Ongoing, Not Progressing   Goal Outcome Evaluation:  Plan of Care Reviewed With: patient        Progress: no change

## 2023-12-23 NOTE — DISCHARGE SUMMARY
[facility]    Provider, No Known    CHIEF COMPLAINT:     Nausea and vomiting    HISTORY OF PRESENT ILLNESS:    HPI  Patient is a 18-year-old male who presents with his mother with complaints of epigastric pain nausea and vomiting.    that started 3 months ago after trying hallucinogenic mushrooms.  Frequent marijuana use denies any IVDA.  Denies any hematemesis melena or hematochezia.  No fever or upper respiratory complaints or diarrhea.  No chest pain or shortness of breath.  Denies any alcohol use NSAID use melena or hematochezia. The ER 2 days ago for the same symptoms and states the medications he was discharged with are not working; he has a new patient appointment with GI in January but feels like his symptoms are worsening    History reviewed. No pertinent past medical history.  History reviewed. No pertinent surgical history.  Family History   Problem Relation Age of Onset    Irritable bowel syndrome Father      Social History     Tobacco Use    Smoking status: Never     Passive exposure: Never    Smokeless tobacco: Never   Vaping Use    Vaping Use: Never used   Substance Use Topics    Alcohol use: Not Currently    Drug use: Never     Medications Prior to Admission   Medication Sig Dispense Refill Last Dose    ondansetron ODT (ZOFRAN-ODT) 8 MG disintegrating tablet Place 1 tablet under the tongue Every 8 (Eight) Hours As Needed for Nausea. 20 tablet 0     pantoprazole (PROTONIX) 20 MG EC tablet Take 1 tablet by mouth Daily. 30 tablet 0      Allergies:  Patient has no known allergies.      There is no immunization history on file for this patient.        REVIEW OF SYSTEMS:    Review of Systems   Constitutional: Positive for malaise/fatigue.   HENT: Negative.     Eyes: Negative.    Cardiovascular: Negative.    Respiratory: Negative.     Endocrine: Negative.    Hematologic/Lymphatic: Negative.    Skin: Negative.    Musculoskeletal: Negative.    Gastrointestinal:  Positive for abdominal  pain and nausea.   Genitourinary: Negative.    Neurological: Negative.    Psychiatric/Behavioral: Negative.     Allergic/Immunologic: Negative.        Vital Signs  Temp:  [97.6 °F (36.4 °C)-98.5 °F (36.9 °C)] 98.4 °F (36.9 °C)  Heart Rate:  [] 99  Resp:  [10-18] 18  BP: (116-165)/(62-89) 165/79          Physical Exam:  Physical Exam  Vitals and nursing note reviewed.   Constitutional:       Appearance: Normal appearance.   HENT:      Head: Normocephalic and atraumatic.      Right Ear: External ear normal.      Left Ear: External ear normal.      Nose: Nose normal.      Mouth/Throat:      Pharynx: Oropharynx is clear.   Eyes:      Extraocular Movements: Extraocular movements intact.      Conjunctiva/sclera: Conjunctivae normal.      Pupils: Pupils are equal, round, and reactive to light.   Cardiovascular:      Rate and Rhythm: Normal rate and regular rhythm.      Pulses: Normal pulses.      Heart sounds: Normal heart sounds.   Pulmonary:      Effort: Pulmonary effort is normal.      Breath sounds: Normal breath sounds.   Abdominal:      General: Bowel sounds are normal.      Palpations: Abdomen is soft.   Musculoskeletal:         General: Normal range of motion.      Cervical back: Normal range of motion.   Skin:     General: Skin is warm.      Capillary Refill: Capillary refill takes less than 2 seconds.   Neurological:      Mental Status: He is alert and oriented to person, place, and time.   Psychiatric:         Mood and Affect: Mood is anxious.         Behavior: Behavior normal.         Thought Content: Thought content normal.         Judgment: Judgment normal.         Emotional Behavior:    WNl   Debilities:   none  Results Review:    I reviewed the patient's new clinical results.  Lab Results (most recent)       Procedure Component Value Units Date/Time    Basic Metabolic Panel [[id number]]  (Normal) Collected: 12/23/23 0419    Specimen: Blood from Arm, Left Updated: 12/23/23 0507     Glucose 85 mg/dL       BUN 10 mg/dL      Creatinine 1.00 mg/dL      Sodium 138 mmol/L      Potassium 3.8 mmol/L      Chloride 100 mmol/L      CO2 24.0 mmol/L      Calcium 9.1 mg/dL      BUN/Creatinine Ratio 10.0     Anion Gap 14.0 mmol/L      eGFR 110.5 mL/min/1.73     Narrative:      GFR Normal >60  Chronic Kidney Disease <60  Kidney Failure <15      CBC & Differential [[id number]]  (Normal) Collected: 12/23/23 0419    Specimen: Blood from Arm, Left Updated: 12/23/23 0449    Narrative:      The following orders were created for panel order CBC & Differential.  Procedure                               Abnormality         Status                     ---------                               -----------         ------                     CBC Auto Differential[[id number]]        Normal              Final result                 Please view results for these tests on the individual orders.    CBC Auto Differential [[id number]]  (Normal) Collected: 12/23/23 0419    Specimen: Blood from Arm, Left Updated: 12/23/23 0449     WBC 8.10 10*3/mm3      RBC 5.04 10*6/mm3      Hemoglobin 15.5 g/dL      Hematocrit 45.3 %      MCV 89.9 fL      MCH 30.7 pg      MCHC 34.2 g/dL      RDW 13.2 %      RDW-SD 41.1 fl      MPV 8.0 fL      Platelets 244 10*3/mm3      Neutrophil % 61.5 %      Lymphocyte % 29.8 %      Monocyte % 7.8 %      Eosinophil % 0.6 %      Basophil % 0.3 %      Neutrophils, Absolute 5.00 10*3/mm3      Lymphocytes, Absolute 2.40 10*3/mm3      Monocytes, Absolute 0.60 10*3/mm3      Eosinophils, Absolute 0.00 10*3/mm3      Basophils, Absolute 0.00 10*3/mm3      nRBC 0.1 /100 WBC     Alpha - 1 - Antitrypsin [[id number]]  (Normal) Collected: 12/22/23 0745    Specimen: Blood Updated: 12/22/23 1634     ALPHA -1 ANTITRYPSIN 151 mg/dL     Ceruloplasmin [[id number]]  (Normal) Collected: 12/22/23 0745    Specimen: Blood Updated: 12/22/23 1634     Ceruloplasmin 20 mg/dL     Hepatitis Panel, Acute [[id number]]  (Normal) Collected: 12/22/23 1243    Specimen: Blood  Updated: 12/22/23 1327     Hepatitis B Surface Ag Non-Reactive     Hep A IgM Non-Reactive     Hep B C IgM Non-Reactive     Hepatitis C Ab Non-Reactive    Narrative:      Results may be falsely decreased if patient taking Biotin.     Anti-microsomal Antibody [[id number]] Collected: 12/22/23 1243    Specimen: Blood Updated: 12/22/23 1251    Anti-Smooth Muscle Antibody Titer [[id number]] Collected: 12/22/23 1243    Specimen: Blood Updated: 12/22/23 1251    Mitochondrial Antibodies, M2 [[id number]] Collected: 12/22/23 1243    Specimen: Blood Updated: 12/22/23 1251    CATHERINE [[id number]] Collected: 12/22/23 1243    Specimen: Blood Updated: 12/22/23 1251    Ferritin [[id number]]  (Normal) Collected: 12/22/23 0745    Specimen: Blood Updated: 12/22/23 1126     Ferritin 219.20 ng/mL     Narrative:      Results may be falsely decreased if patient taking Biotin.      Iron [[id number]]  (Normal) Collected: 12/22/23 0745    Specimen: Blood Updated: 12/22/23 1118     Iron 131 mcg/dL     Urine Drug Screen - Urine, Clean Catch [[id number]]  (Abnormal) Collected: 12/22/23 0941    Specimen: Urine, Clean Catch Updated: 12/22/23 1009     Amphet/Methamphet, Screen Negative     Barbiturates Screen, Urine Negative     Benzodiazepine Screen, Urine Negative     Cocaine Screen, Urine Negative     Opiate Screen Negative     THC, Screen, Urine Positive     Methadone Screen, Urine Negative     Oxycodone Screen, Urine Negative    Narrative:      Negative Thresholds Per Drugs Screened:    Amphetamines                 500 ng/ml  Barbiturates                 200 ng/ml  Benzodiazepines              100 ng/ml  Cocaine                      300 ng/ml  Methadone                    300 ng/ml  Opiates                      300 ng/ml  Oxycodone                    100 ng/ml  THC                           50 ng/ml    The Normal Value for all drugs tested is negative. This report includes final unconfirmed screening results to be used for medical treatment purposes  only. Unconfirmed results must not be used for non-medical purposes such as employment or legal testing. Clinical consideration should be applied to any drug of abuse test, particularly when unconfirmed results are used.          All urine drugs of abuse requests without chain of custody are for medical screening purposes only.  False positives are possible.      Urinalysis With Microscopic If Indicated (No Culture) - Urine, Clean Catch [[id number]]  (Abnormal) Collected: 12/22/23 0941    Specimen: Urine, Clean Catch Updated: 12/22/23 0952     Color, UA Dark Yellow     Appearance, UA Cloudy     pH, UA <=5.0     Specific Gravity, UA 1.022     Glucose, UA Negative     Ketones, UA 80 mg/dL (3+)     Bilirubin, UA Negative     Blood, UA Negative     Protein, UA Negative     Leuk Esterase, UA Trace     Nitrite, UA Negative     Urobilinogen, UA 1.0 E.U./dL    Urinalysis, Microscopic Only - Urine, Clean Catch [[id number]] Collected: 12/22/23 0941    Specimen: Urine, Clean Catch Updated: 12/22/23 0952     RBC, UA 0-2 /HPF      WBC, UA 0-2 /HPF      Bacteria, UA None Seen /HPF      Squamous Epithelial Cells, UA 0-2 /HPF      Hyaline Casts, UA None Seen /LPF      Methodology Automated Microscopy    Comprehensive Metabolic Panel [[id number]]  (Abnormal) Collected: 12/22/23 0745    Specimen: Blood Updated: 12/22/23 0811     Glucose 102 mg/dL      BUN 11 mg/dL      Creatinine 1.14 mg/dL      Sodium 135 mmol/L      Potassium 3.7 mmol/L      Chloride 96 mmol/L      CO2 25.0 mmol/L      Calcium 9.7 mg/dL      Total Protein 7.6 g/dL      Albumin 4.6 g/dL      ALT (SGPT) 50 U/L      AST (SGOT) 28 U/L      Alkaline Phosphatase 121 U/L      Total Bilirubin 0.7 mg/dL      Globulin 3.0 gm/dL      A/G Ratio 1.5 g/dL      BUN/Creatinine Ratio 9.6     Anion Gap 14.0 mmol/L      eGFR 94.4 mL/min/1.73     Narrative:      GFR Normal >60  Chronic Kidney Disease <60  Kidney Failure <15      Lipase [[id number]]  (Normal) Collected: 12/22/23 0745     Specimen: Blood Updated: 12/22/23 0811     Lipase 27 U/L     Magnesium [[id number]]  (Normal) Collected: 12/22/23 0745    Specimen: Blood Updated: 12/22/23 0811     Magnesium 1.9 mg/dL     CBC & Differential [[id number]]  (Abnormal) Collected: 12/22/23 0745    Specimen: Blood Updated: 12/22/23 0750    Narrative:      The following orders were created for panel order CBC & Differential.  Procedure                               Abnormality         Status                     ---------                               -----------         ------                     CBC Auto Differential[[id number]]        Abnormal            Final result                 Please view results for these tests on the individual orders.    CBC Auto Differential [[id number]]  (Abnormal) Collected: 12/22/23 0745    Specimen: Blood Updated: 12/22/23 0750     WBC 11.30 10*3/mm3      RBC 5.32 10*6/mm3      Hemoglobin 16.1 g/dL      Hematocrit 47.3 %      MCV 88.8 fL      MCH 30.2 pg      MCHC 34.0 g/dL      RDW 13.5 %      RDW-SD 42.0 fl      MPV 7.8 fL      Platelets 263 10*3/mm3      Neutrophil % 77.3 %      Lymphocyte % 14.8 %      Monocyte % 7.1 %      Eosinophil % 0.2 %      Basophil % 0.6 %      Neutrophils, Absolute 8.70 10*3/mm3      Lymphocytes, Absolute 1.70 10*3/mm3      Monocytes, Absolute 0.80 10*3/mm3      Eosinophils, Absolute 0.00 10*3/mm3      Basophils, Absolute 0.10 10*3/mm3      nRBC 0.0 /100 WBC             Imaging Results (Most Recent)       Procedure Component Value Units Date/Time    US Liver [[id number]] Collected: 12/22/23 1223     Updated: 12/22/23 1227    Narrative:      US LIVER    Date of Exam: 12/22/2023 12:00 PM EST    Indication: elevated LFTs.    Comparison: No comparisons available.    Technique: Grayscale and color Doppler ultrasound evaluation of the right upper quadrant was performed.      Findings:  The liver measures 14.8 cm in superior-inferior dimension. No focal hepatic mass lesions are identified. The liver  parenchyma is isoechoic with the adjacent right kidney. There is appropriate flow waveform and direction in the portal and hepatic veins.   Common bile duct measures 4 mm. No ascites is demonstrated. Gallbladder and pancreas were not imaged.       Impression:      Impression:  Normal sonographic appearance of the liver. Patent portal vein.        Electronically Signed: Jesus Martinez MD    12/22/2023 12:25 PM EST    Workstation ID: [id number]          reviewed    ECG/EMG Results (most recent)       None          reviewed            Microbiology Results (last 10 days)       ** No results found for the last 240 hours. **            Assessment & Plan     Epigastric pain    Nausea and vomiting     Abdominal pain/nausea and vomiting  Lab Results   Component Value Date    WBC 8.10 12/23/2023    AST 28 12/22/2023    ALT 50 (H) 12/22/2023    ALKPHOS 121 (H) 12/22/2023    BILITOT 0.7 12/22/2023    LIPASE 27 12/22/2023   -CT of abdomen and pelvis: 12/20/23 showed no acute abdominal or pelvic process  -Ultrasound of liver showed normal appearance of the liver with patent portal veins  -Continue IV fluids from ED  -Continue PPI  -Low residue diet  -GI consultation for EGD    Substance Abuse  -UDS positive for THC  -Cessation encouraged    I discussed the patients findings and my recommendations with patient and family.     Discharge Diagnosis:      Epigastric pain    Nausea and vomiting      Hospital Course  Patient is a 18 y.o. male presented with nausea and vomiting.  Patient states he has been nauseous with vomiting the past few days.  Patient reports productive cough and decreased appetite as well.  Patient states possibility of coffee-ground emesis at 1 point.  Patient takes ibuprofen intermittently.  Patient does report smoking marijuana multiple times a day and tried hallucinogenic mushrooms couple months ago with no known side effects from medications.  White blood cell within normal limits.  AST 28, ALT 50, alkaline  phosphate 121, lipase 27.  CT of pelvis on 12/20 showed no acute abdominal or pelvic process seen.  Ultrasound liver showed normal appearance with patent portal vein.  Patient was seen by GI which recommended EGD.  Patient tolerated procedure well without complications which showed duodenal erosion, gastritis and GERD.  Patient to take PPI twice daily for 1 to 2 months and then daily thereafter.  Patient to discontinue marijuana use and minimize NSAIDs.  Patient to follow with GI for pathology results and follow-up with medication.  Patient to follow PCP in 1 week for continued care management.  Testing recommendations reviewed with patient and family and they agree with treatment plan.  If symptoms worsen patient to call 911 or go to nearest ED.    Past Medical History:   History reviewed. No pertinent past medical history.    Past Surgical History:   History reviewed. No pertinent surgical history.    Social History:   Social History     Socioeconomic History    Marital status: Single   Tobacco Use    Smoking status: Never     Passive exposure: Never    Smokeless tobacco: Never   Vaping Use    Vaping Use: Never used   Substance and Sexual Activity    Alcohol use: Not Currently    Drug use: Never    Sexual activity: Not Currently       Procedures Performed    Procedure(s):  ESOPHAGOGASTRODUODENOSCOPY with biopsy x 2  -------------------  12/22 1438 UPPER GI ENDOSCOPY    Consults:   Consults       Date and Time Order Name Status Description    12/22/2023  9:21 AM Gastroenterology (on-call MD unless specified) Completed             Condition on Discharge:     Stable    Discharge Disposition  Home or Self Care    Discharge Medications     Discharge Medications        New Medications        Instructions Start Date   ondansetron 4 MG tablet  Commonly known as: Zofran   4 mg, Oral, Every 8 Hours PRN      Scopolamine 1 MG/3DAYS patch   1 patch, Transdermal, Every 72 Hours   Start Date: December 25, 2023            Changes  to Medications        Instructions Start Date   pantoprazole 40 MG EC tablet  Commonly known as: PROTONIX  What changed:   medication strength  how much to take  when to take this   40 mg, Oral, 2 Times Daily      pantoprazole 40 MG EC tablet  Commonly known as: Protonix  What changed: You were already taking a medication with the same name, and this prescription was added. Make sure you understand how and when to take each.   40 mg, Oral, Daily   Start Date: February 22, 2024            Stop These Medications      ondansetron ODT 8 MG disintegrating tablet  Commonly known as: ZOFRAN-ODT              Discharge Diet:   Diet Instructions       Diet: Gastrointestinal Diets; Low Irritant; Regular Texture (IDDSI 7); Thin (IDDSI 0)      Discharge Diet: Gastrointestinal Diets    Gastrointestinal Diet: Low Irritant    Texture: Regular Texture (IDDSI 7)    Fluid Consistency: Thin (IDDSI 0)            Activity at Discharge:   Activity Instructions       Activity as Tolerated      Measure Blood Pressure              Follow-up Appointments  No future appointments.  Additional Instructions for the Follow-ups that You Need to Schedule       Discharge Follow-up with PCP   As directed       Currently Documented PCP:    Provider, No Known    PCP Phone Number:    None     Follow Up Details: 7-10 days                Test Results Pending at Discharge  Pending Labs       Order Current Status    Tissue Pathology Exam Collected (12/22/23 1617)    CATHERINE In process    Anti-Smooth Muscle Antibody Titer In process    Anti-microsomal Antibody In process    Mitochondrial Antibodies, M2 In process             Risk for Readmission (LACE) Score: 2 (12/23/2023  6:00 AM)          SAM Baker  12/23/23  10:28 EST

## 2023-12-26 LAB
LKM-1 AB SER-ACNC: 2.7 UNITS (ref 0–20)
MITOCHONDRIA M2 IGG SER-ACNC: <20 UNITS (ref 0–20)
SMA IGG SER-ACNC: 7 UNITS (ref 0–19)

## 2023-12-27 LAB
ANA SER QL: NEGATIVE
LAB AP CASE REPORT: NORMAL
PATH REPORT.FINAL DX SPEC: NORMAL
PATH REPORT.GROSS SPEC: NORMAL

## 2024-01-03 ENCOUNTER — HOSPITAL ENCOUNTER (EMERGENCY)
Facility: HOSPITAL | Age: 19
Discharge: HOME | End: 2024-01-03
Attending: EMERGENCY MEDICINE | Admitting: EMERGENCY MEDICINE
Payer: MEDICAID

## 2024-01-03 VITALS
TEMPERATURE: 98 F | HEART RATE: 74 BPM | HEIGHT: 74 IN | RESPIRATION RATE: 16 BRPM | WEIGHT: 300 LBS | DIASTOLIC BLOOD PRESSURE: 73 MMHG | OXYGEN SATURATION: 99 % | BODY MASS INDEX: 38.5 KG/M2 | SYSTOLIC BLOOD PRESSURE: 129 MMHG

## 2024-01-03 DIAGNOSIS — R11.2 NAUSEA AND VOMITING, UNSPECIFIED VOMITING TYPE: Primary | ICD-10-CM

## 2024-01-03 DIAGNOSIS — A04.8 H. PYLORI INFECTION: ICD-10-CM

## 2024-01-03 LAB
ALBUMIN SERPL-MCNC: 4.4 G/DL (ref 3.5–5.2)
ALBUMIN/GLOB SERPL: 1.6 G/DL
ALP SERPL-CCNC: 102 U/L (ref 39–117)
ALT SERPL W P-5'-P-CCNC: 34 U/L (ref 1–41)
ANION GAP SERPL CALCULATED.3IONS-SCNC: 15 MMOL/L (ref 5–15)
AST SERPL-CCNC: 28 U/L (ref 1–40)
BASOPHILS # BLD AUTO: 0 10*3/MM3 (ref 0–0.2)
BASOPHILS NFR BLD AUTO: 0.5 % (ref 0–1.5)
BILIRUB SERPL-MCNC: 0.4 MG/DL (ref 0–1.2)
BUN SERPL-MCNC: 8 MG/DL (ref 6–20)
BUN/CREAT SERPL: 7.1 (ref 7–25)
CALCIUM SPEC-SCNC: 9.3 MG/DL (ref 8.6–10.5)
CHLORIDE SERPL-SCNC: 101 MMOL/L (ref 98–107)
CO2 SERPL-SCNC: 23 MMOL/L (ref 22–29)
CREAT SERPL-MCNC: 1.13 MG/DL (ref 0.76–1.27)
DEPRECATED RDW RBC AUTO: 40.7 FL (ref 37–54)
EGFRCR SERPLBLD CKD-EPI 2021: 95.4 ML/MIN/1.73
EOSINOPHIL # BLD AUTO: 0 10*3/MM3 (ref 0–0.4)
EOSINOPHIL NFR BLD AUTO: 0.2 % (ref 0.3–6.2)
ERYTHROCYTE [DISTWIDTH] IN BLOOD BY AUTOMATED COUNT: 13.3 % (ref 12.3–15.4)
GLOBULIN UR ELPH-MCNC: 2.8 GM/DL
GLUCOSE SERPL-MCNC: 94 MG/DL (ref 65–99)
HCT VFR BLD AUTO: 48.7 % (ref 37.5–51)
HGB BLD-MCNC: 17 G/DL (ref 13–17.7)
LIPASE SERPL-CCNC: 16 U/L (ref 13–60)
LYMPHOCYTES # BLD AUTO: 1.8 10*3/MM3 (ref 0.7–3.1)
LYMPHOCYTES NFR BLD AUTO: 19.5 % (ref 19.6–45.3)
MCH RBC QN AUTO: 30.8 PG (ref 26.6–33)
MCHC RBC AUTO-ENTMCNC: 34.8 G/DL (ref 31.5–35.7)
MCV RBC AUTO: 88.4 FL (ref 79–97)
MONOCYTES # BLD AUTO: 0.7 10*3/MM3 (ref 0.1–0.9)
MONOCYTES NFR BLD AUTO: 7 % (ref 5–12)
NEUTROPHILS NFR BLD AUTO: 6.8 10*3/MM3 (ref 1.7–7)
NEUTROPHILS NFR BLD AUTO: 72.8 % (ref 42.7–76)
NRBC BLD AUTO-RTO: 0.2 /100 WBC (ref 0–0.2)
PLATELET # BLD AUTO: 215 10*3/MM3 (ref 140–450)
PMV BLD AUTO: 8.5 FL (ref 6–12)
POTASSIUM SERPL-SCNC: 3.4 MMOL/L (ref 3.5–5.2)
PROT SERPL-MCNC: 7.2 G/DL (ref 6–8.5)
RBC # BLD AUTO: 5.51 10*6/MM3 (ref 4.14–5.8)
SODIUM SERPL-SCNC: 139 MMOL/L (ref 136–145)
WBC NRBC COR # BLD AUTO: 9.4 10*3/MM3 (ref 3.4–10.8)

## 2024-01-03 PROCEDURE — 96374 THER/PROPH/DIAG INJ IV PUSH: CPT

## 2024-01-03 PROCEDURE — 25810000003 SODIUM CHLORIDE 0.9 % SOLUTION: Performed by: NURSE PRACTITIONER

## 2024-01-03 PROCEDURE — 25010000002 ONDANSETRON PER 1 MG: Performed by: NURSE PRACTITIONER

## 2024-01-03 PROCEDURE — 80053 COMPREHEN METABOLIC PANEL: CPT | Performed by: NURSE PRACTITIONER

## 2024-01-03 PROCEDURE — 83690 ASSAY OF LIPASE: CPT | Performed by: NURSE PRACTITIONER

## 2024-01-03 PROCEDURE — 85025 COMPLETE CBC W/AUTO DIFF WBC: CPT | Performed by: NURSE PRACTITIONER

## 2024-01-03 PROCEDURE — 96361 HYDRATE IV INFUSION ADD-ON: CPT

## 2024-01-03 PROCEDURE — 99283 EMERGENCY DEPT VISIT LOW MDM: CPT

## 2024-01-03 PROCEDURE — 63710000001 PROMETHAZINE PER 25 MG: Performed by: NURSE PRACTITIONER

## 2024-01-03 RX ORDER — ONDANSETRON 2 MG/ML
4 INJECTION INTRAMUSCULAR; INTRAVENOUS ONCE
Status: COMPLETED | OUTPATIENT
Start: 2024-01-03 | End: 2024-01-03

## 2024-01-03 RX ORDER — PROMETHAZINE HYDROCHLORIDE 25 MG/1
25 TABLET ORAL EVERY 6 HOURS PRN
Qty: 12 TABLET | Refills: 0 | Status: SHIPPED | OUTPATIENT
Start: 2024-01-03

## 2024-01-03 RX ORDER — PROMETHAZINE HYDROCHLORIDE 25 MG/1
25 TABLET ORAL EVERY 6 HOURS PRN
Qty: 12 TABLET | Refills: 0 | Status: SHIPPED | OUTPATIENT
Start: 2024-01-03 | End: 2024-01-03 | Stop reason: SDUPTHER

## 2024-01-03 RX ORDER — SODIUM CHLORIDE 0.9 % (FLUSH) 0.9 %
10 SYRINGE (ML) INJECTION AS NEEDED
Status: DISCONTINUED | OUTPATIENT
Start: 2024-01-03 | End: 2024-01-03 | Stop reason: HOSPADM

## 2024-01-03 RX ORDER — PROMETHAZINE HYDROCHLORIDE 25 MG/1
25 TABLET ORAL ONCE
Status: COMPLETED | OUTPATIENT
Start: 2024-01-03 | End: 2024-01-03

## 2024-01-03 RX ADMIN — ONDANSETRON 4 MG: 2 INJECTION INTRAMUSCULAR; INTRAVENOUS at 18:38

## 2024-01-03 RX ADMIN — SODIUM CHLORIDE 1000 ML: 9 INJECTION, SOLUTION INTRAVENOUS at 18:38

## 2024-01-03 RX ADMIN — PROMETHAZINE HYDROCHLORIDE 25 MG: 25 TABLET ORAL at 19:45

## 2024-01-03 NOTE — Clinical Note
[facility] EMERGENCY DEPARTMENT  [address] [place] [address]  Phone: [phone]    Mehrdad Pollock was seen and treated in our emergency department on 1/3/2024.  He may return to work on 01/05/2024.         Thank you for choosing [facility].    Genesis Hernandez RN

## 2024-01-03 NOTE — Clinical Note
[facility] EMERGENCY DEPARTMENT  [address] [place] [address]  Phone: [phone]    Mehrdad Pollock was seen and treated in our emergency department on 1/3/2024.  He may return to work on 01/05/2024.         Thank you for choosing [facility].    Genesis Hernandez RN

## 2024-01-03 NOTE — ED NOTES
Pt complaining of vomiting x1 week; pt states he has held no fluids down & has only been able to hold his antibiotics down;

## 2024-01-03 NOTE — ED PROVIDER NOTES
Subjective   History of Present Illness  Patient is a 18-year-old obese gentleman who comes in complaining of nausea vomiting and states that he feels like he is dehydrated.    He had a recent admission for nausea and vomiting and had a scope which he states Dr. Gaitan did and called him a few days later and told him that it was positive for H. pylori and gave him all the antibiotics to treat it which he states he has been able to keep down but states he has not been able to keep anything else down.    He is alert oriented nontoxic his mother is at bedside      Review of Systems   Constitutional:  Negative for chills, fatigue and fever.   HENT:  Negative for congestion, tinnitus and trouble swallowing.    Eyes:  Negative for photophobia, discharge and redness.   Respiratory:  Negative for cough and shortness of breath.    Cardiovascular:  Negative for chest pain and palpitations.   Gastrointestinal:  Positive for nausea and vomiting. Negative for abdominal pain and diarrhea.   Genitourinary:  Negative for dysuria, frequency and urgency.   Musculoskeletal:  Negative for back pain, joint swelling and myalgias.   Skin:  Negative for rash.   Neurological:  Negative for dizziness and headaches.   Psychiatric/Behavioral:  Negative for confusion.    All other systems reviewed and are negative.      History reviewed. No pertinent past medical history.    No Known Allergies    Past Surgical History:   Procedure Laterality Date    ENDOSCOPY N/A 12/22/2023    Procedure: ESOPHAGOGASTRODUODENOSCOPY with biopsy x 2;  Surgeon: Kassandra Aragon MD;  Location: [facility] ENDOSCOPY;  Service: Gastroenterology;  Laterality: N/A;  duodenal erosions, gastritis, GERD       Family History   Problem Relation Age of Onset    Irritable bowel syndrome Father        Social History     Socioeconomic History    Marital status: Single   Tobacco Use    Smoking status: Never     Passive exposure: Never    Smokeless tobacco: Never   Vaping Use     Vaping Use: Never used   Substance and Sexual Activity    Alcohol use: Not Currently    Drug use: Never    Sexual activity: Not Currently           Objective   Physical Exam  Vitals reviewed.   Constitutional:       General: He is not in acute distress.     Appearance: Normal appearance. He is well-developed. He is obese. He is not ill-appearing, toxic-appearing or diaphoretic.   HENT:      Head: Normocephalic and atraumatic.   Eyes:      Conjunctiva/sclera: Conjunctivae normal.      Pupils: Pupils are equal, round, and reactive to light.   Cardiovascular:      Rate and Rhythm: Normal rate and regular rhythm.      Heart sounds: Normal heart sounds.   Pulmonary:      Effort: Pulmonary effort is normal.      Breath sounds: Normal breath sounds.   Abdominal:      General: Bowel sounds are normal.      Palpations: Abdomen is soft.   Musculoskeletal:         General: Normal range of motion.      Cervical back: Normal range of motion and neck supple.   Skin:     General: Skin is warm and dry.   Neurological:      Mental Status: He is alert and oriented to person, place, and time.      GCS: GCS eye subscore is 4. GCS verbal subscore is 5. GCS motor subscore is 6.      Cranial Nerves: Cranial nerve deficit: .[id number].   Psychiatric:         Attention and Perception: Attention normal.         Mood and Affect: Affect is flat.         Speech: Speech normal.         Behavior: Behavior normal. Behavior is cooperative.         Thought Content: Thought content normal.         Cognition and Memory: Cognition and memory normal.         Procedures           ED Course  ED Course as of 01/03/24 2143 Wed Jan 03, 2024 1834 Patient states that GI called him and stated that the scope culture indicated that he has H. pylori and he has been started on H. pylori medications and has been taking them for about a week [KW]   1932 I reviewed the results with the patient and his mother at bedside the patient states he feels much better.  He is  "continuing getting his fluids and will have a p.o. challenge [KW]      ED Course User Index  [KW] Griselda Skaggs, SAM    /73 (BP Location: Right arm, Patient Position: Lying)   Pulse 74   Temp 98 °F (36.7 °C) (Oral)   Resp 16   Ht 188 cm (74\")   Wt 136 kg (300 lb)   SpO2 99%   BMI 38.52 kg/m²     /73 (BP Location: Right arm, Patient Position: Lying)   Pulse 74   Temp 98 °F (36.7 °C) (Oral)   Resp 16   Ht 188 cm (74\")   Wt 136 kg (300 lb)   SpO2 99%   BMI 38.52 kg/m²   Labs Reviewed   COMPREHENSIVE METABOLIC PANEL - Abnormal; Notable for the following components:       Result Value    Potassium 3.4 (*)     All other components within normal limits    Narrative:     GFR Normal >60  Chronic Kidney Disease <60  Kidney Failure <15     CBC WITH AUTO DIFFERENTIAL - Abnormal; Notable for the following components:    Lymphocyte % 19.5 (*)     Eosinophil % 0.2 (*)     All other components within normal limits   LIPASE - Normal   URINALYSIS W/ MICROSCOPIC IF INDICATED (NO CULTURE)   CBC AND DIFFERENTIAL    Narrative:     The following orders were created for panel order CBC & Differential.  Procedure                               Abnormality         Status                     ---------                               -----------         ------                     CBC Auto Differential[[id number]]        Abnormal            Final result                 Please view results for these tests on the individual orders.     Medications   sodium chloride 0.9 % flush 10 mL (has no administration in time range)   sodium chloride 0.9 % bolus 1,000 mL (0 mL Intravenous Stopped 1/3/24 2045)   ondansetron (ZOFRAN) injection 4 mg (4 mg Intravenous Given 1/3/24 1838)   promethazine (PHENERGAN) tablet 25 mg (25 mg Oral Given 1/3/24 1945)     No radiology results for the last day                                         Medical Decision Making  Discharge summary from patient's most recent hospital admission  " 12/22/2023  Hospital Course  Patient is a 18 y.o. male presented with nausea and vomiting.  Patient states he has been nauseous with vomiting the past few days.  Patient reports productive cough and decreased appetite as well.  Patient states possibility of coffee-ground emesis at 1 point.  Patient takes ibuprofen intermittently.  Patient does report smoking marijuana multiple times a day and tried hallucinogenic mushrooms couple months ago with no known side effects from medications.  White blood cell within normal limits.  AST 28, ALT 50, alkaline phosphate 121, lipase 27.  CT of pelvis on 12/20 showed no acute abdominal or pelvic process seen.  Ultrasound liver showed normal appearance with patent portal vein.  Patient was seen by GI which recommended EGD.  Patient tolerated procedure well without complications which showed duodenal erosion, gastritis and GERD.  Patient to take PPI twice daily for 1 to 2 months and then daily thereafter.  Patient to discontinue marijuana use and minimize NSAIDs.  Patient to follow with GI for pathology results and follow-up with medication.  Patient to follow PCP in 1 week for continued care management.  Testing recommendations reviewed with patient and family and they agree with treatment plan.  If symptoms worsen patient to call 911 or go to nearest ED.      Patient had IV established and blood work was obtained the patient was found to have normal CBC and chemistry as interpreted by myself he was given a liter of fluid he was given some Zofran for nausea and on reevaluation states he is feeling better.  He will be given a p.o. challenge did well and was discharged home with his mother to follow-up as scheduled    We talked about his negative labs and the need to push clear liquids I will give him of the Phenergan before discharge and write him a prescription for Phenergan and advised him to follow-up with primary care or GI for further management I put some of these on his  discharge instructions he verbalized understood discharge instructions as well as his mother at bedside      Problems Addressed:  H. pylori infection: complicated acute illness or injury  Nausea and vomiting, unspecified vomiting type: complicated acute illness or injury    Amount and/or Complexity of Data Reviewed  Independent Historian: parent     Details: Mother at bedside  External Data Reviewed: labs and notes.     Details: From previous visit and admission  Labs: ordered. Decision-making details documented in ED Course.  Radiology: ordered and independent interpretation performed. Decision-making details documented in ED Course.  ECG/medicine tests: ordered and independent interpretation performed. Decision-making details documented in ED Course.    Risk  OTC drugs.  Prescription drug management.        Final diagnoses:   Nausea and vomiting, unspecified vomiting type   H. pylori infection       ED Disposition  ED Disposition       ED Disposition   Discharge    Condition   Stable    Comment   --               Curt Rosales PA-C  [address]  [address]  [place] [address] [phone]    In 3 days  If symptoms worsen, As needed         Medication List        New Prescriptions      promethazine 25 MG tablet  Commonly known as: PHENERGAN  Take 1 tablet by mouth Every 6 (Six) Hours As Needed for Nausea or Vomiting.               Where to Get Your Medications        These medications were sent to [facility]/pharmacy #[id number] - [facility] [place] - [address] - [phone] [place] [phone]   [address] [facility] [place] [address]      Phone: [phone]   promethazine 25 MG tablet            Griselda Skaggs, APRN  01/03/24 4400

## 2024-01-04 NOTE — DISCHARGE INSTRUCTIONS
Continue to push clear liquids and small amounts    Use the Zofran you have at home with the Phenergan tablets to help control nausea and vomiting    Continue taking your antibiotics till gone    Establish care with primary care and/or GI for further management of symptoms    Return if worse

## 2025-01-04 ENCOUNTER — APPOINTMENT (OUTPATIENT)
Dept: CT IMAGING | Facility: HOSPITAL | Age: 20
End: 2025-01-04
Payer: MEDICAID

## 2025-01-04 ENCOUNTER — HOSPITAL ENCOUNTER (EMERGENCY)
Facility: HOSPITAL | Age: 20
Discharge: HOME | End: 2025-01-04
Attending: EMERGENCY MEDICINE
Payer: MEDICAID

## 2025-01-04 VITALS
TEMPERATURE: 98.1 F | WEIGHT: 250.66 LBS | HEART RATE: 108 BPM | DIASTOLIC BLOOD PRESSURE: 92 MMHG | SYSTOLIC BLOOD PRESSURE: 148 MMHG | OXYGEN SATURATION: 98 % | BODY MASS INDEX: 32.17 KG/M2 | RESPIRATION RATE: 20 BRPM | HEIGHT: 74 IN

## 2025-01-04 DIAGNOSIS — R09.A2 FOREIGN BODY SENSATION, THROAT: Primary | ICD-10-CM

## 2025-01-04 LAB
ANION GAP SERPL CALCULATED.3IONS-SCNC: 13.3 MMOL/L (ref 5–15)
BASOPHILS # BLD AUTO: 0.02 10*3/MM3 (ref 0–0.2)
BASOPHILS NFR BLD AUTO: 0.2 % (ref 0–1.5)
BUN SERPL-MCNC: 10 MG/DL (ref 6–20)
BUN/CREAT SERPL: 8.7 (ref 7–25)
CALCIUM SPEC-SCNC: 9.9 MG/DL (ref 8.6–10.5)
CHLORIDE SERPL-SCNC: 102 MMOL/L (ref 98–107)
CO2 SERPL-SCNC: 23.7 MMOL/L (ref 22–29)
CREAT SERPL-MCNC: 1.15 MG/DL (ref 0.76–1.27)
DEPRECATED RDW RBC AUTO: 40.8 FL (ref 37–54)
EGFRCR SERPLBLD CKD-EPI 2021: 92.9 ML/MIN/1.73
EOSINOPHIL # BLD AUTO: 0.02 10*3/MM3 (ref 0–0.4)
EOSINOPHIL NFR BLD AUTO: 0.2 % (ref 0.3–6.2)
ERYTHROCYTE [DISTWIDTH] IN BLOOD BY AUTOMATED COUNT: 13 % (ref 12.3–15.4)
GLUCOSE SERPL-MCNC: 86 MG/DL (ref 65–99)
HCT VFR BLD AUTO: 45.8 % (ref 37.5–51)
HGB BLD-MCNC: 16.4 G/DL (ref 13–17.7)
IMM GRANULOCYTES # BLD AUTO: 0.02 10*3/MM3 (ref 0–0.05)
IMM GRANULOCYTES NFR BLD AUTO: 0.2 % (ref 0–0.5)
LYMPHOCYTES # BLD AUTO: 2.32 10*3/MM3 (ref 0.7–3.1)
LYMPHOCYTES NFR BLD AUTO: 24.9 % (ref 19.6–45.3)
MCH RBC QN AUTO: 30.9 PG (ref 26.6–33)
MCHC RBC AUTO-ENTMCNC: 35.8 G/DL (ref 31.5–35.7)
MCV RBC AUTO: 86.4 FL (ref 79–97)
MONOCYTES # BLD AUTO: 0.64 10*3/MM3 (ref 0.1–0.9)
MONOCYTES NFR BLD AUTO: 6.9 % (ref 5–12)
NEUTROPHILS NFR BLD AUTO: 6.31 10*3/MM3 (ref 1.7–7)
NEUTROPHILS NFR BLD AUTO: 67.6 % (ref 42.7–76)
NRBC BLD AUTO-RTO: 0 /100 WBC (ref 0–0.2)
PLATELET # BLD AUTO: 237 10*3/MM3 (ref 140–450)
PMV BLD AUTO: 9.7 FL (ref 6–12)
POTASSIUM SERPL-SCNC: 3.7 MMOL/L (ref 3.5–5.2)
RBC # BLD AUTO: 5.3 10*6/MM3 (ref 4.14–5.8)
SODIUM SERPL-SCNC: 139 MMOL/L (ref 136–145)
WBC NRBC COR # BLD AUTO: 9.33 10*3/MM3 (ref 3.4–10.8)

## 2025-01-04 PROCEDURE — 80048 BASIC METABOLIC PNL TOTAL CA: CPT | Performed by: EMERGENCY MEDICINE

## 2025-01-04 PROCEDURE — 25810000003 SODIUM CHLORIDE 0.9 % SOLUTION: Performed by: EMERGENCY MEDICINE

## 2025-01-04 PROCEDURE — 99284 EMERGENCY DEPT VISIT MOD MDM: CPT

## 2025-01-04 PROCEDURE — 70490 CT SOFT TISSUE NECK W/O DYE: CPT

## 2025-01-04 PROCEDURE — 70450 CT HEAD/BRAIN W/O DYE: CPT

## 2025-01-04 PROCEDURE — 85025 COMPLETE CBC W/AUTO DIFF WBC: CPT | Performed by: EMERGENCY MEDICINE

## 2025-01-04 RX ORDER — SODIUM CHLORIDE 0.9 % (FLUSH) 0.9 %
10 SYRINGE (ML) INJECTION AS NEEDED
Status: DISCONTINUED | OUTPATIENT
Start: 2025-01-04 | End: 2025-01-05 | Stop reason: HOSPADM

## 2025-01-04 RX ADMIN — SODIUM CHLORIDE 500 ML: 9 INJECTION, SOLUTION INTRAVENOUS at 20:45

## 2025-01-05 NOTE — DISCHARGE INSTRUCTIONS
You are seen and evaluated for the sensation of foreign body after plastic.  CT scans revealed no perforation or abscess or emergent findings.  You do need to have visualization and evaluation by specialty consultation of ear nose and throat.  Walk-in clinic hours on this Tuesday.  This is from 8-4 30.  Also call Monday first thing to see if they can get you in for an appointment.  Return to the ER if you develop fever worsening pain symptoms signs or concerns.

## 2025-07-11 NOTE — ED NOTES
Pt still on 1:1 observation with security at bedside. Pt family member still at bedside as well     11-Jul-2025 16:06

## (undated) DEVICE — SINGLE-USE BIOPSY FORCEPS: Brand: RADIAL JAW 4

## (undated) DEVICE — PK ENDO GI 50

## (undated) DEVICE — BITEBLOCK ENDO W/STRAP 60F A/ LF DISP